# Patient Record
Sex: FEMALE | Race: WHITE | Employment: FULL TIME | URBAN - METROPOLITAN AREA
[De-identification: names, ages, dates, MRNs, and addresses within clinical notes are randomized per-mention and may not be internally consistent; named-entity substitution may affect disease eponyms.]

---

## 2017-02-23 PROBLEM — Z98.890 H/O ASCENDING AORTA REPAIR: Status: ACTIVE | Noted: 2017-02-23

## 2017-06-23 ENCOUNTER — HOSPITAL ENCOUNTER (OUTPATIENT)
Dept: CT IMAGING | Age: 42
Discharge: HOME | End: 2017-06-23
Attending: INTERNAL MEDICINE
Payer: COMMERCIAL

## 2017-06-23 DIAGNOSIS — Z98.890 HX OF ASCENDING AORTA REPAIR: ICD-10-CM

## 2017-06-23 PROCEDURE — 71275 CT ANGIOGRAPHY CHEST: CPT

## 2017-06-23 PROCEDURE — 74011636320 HC RX REV CODE- 636/320: Performed by: INTERNAL MEDICINE

## 2017-06-23 PROCEDURE — 74011000258 HC RX REV CODE- 258: Performed by: INTERNAL MEDICINE

## 2017-06-23 RX ORDER — SODIUM CHLORIDE 0.9 % (FLUSH) 0.9 %
10 SYRINGE (ML) INJECTION
Status: COMPLETED | OUTPATIENT
Start: 2017-06-23 | End: 2017-06-23

## 2017-06-23 RX ADMIN — Medication 10 ML: at 10:18

## 2017-06-23 RX ADMIN — SODIUM CHLORIDE 100 ML: 900 INJECTION, SOLUTION INTRAVENOUS at 10:18

## 2017-06-23 RX ADMIN — IOPAMIDOL 119 ML: 755 INJECTION, SOLUTION INTRAVENOUS at 10:18

## 2017-06-27 NOTE — PROGRESS NOTES
Called and informed pt per Dr. Page Acevedo. Albesa Lesch CT scan showed stable aorta with no change.   Pt voiced understanding./wc

## 2018-06-15 ENCOUNTER — HOSPITAL ENCOUNTER (OUTPATIENT)
Dept: CT IMAGING | Age: 43
Discharge: HOME | End: 2018-06-15
Attending: INTERNAL MEDICINE
Payer: COMMERCIAL

## 2018-06-15 DIAGNOSIS — Q87.40 MARFAN'S SYNDROME: Chronic | ICD-10-CM

## 2018-06-15 DIAGNOSIS — I77.810 ASCENDING AORTA DILATION (HCC): Chronic | ICD-10-CM

## 2018-06-15 DIAGNOSIS — Z98.890 H/O ASCENDING AORTA REPAIR: ICD-10-CM

## 2018-06-15 LAB — CREAT BLD-MCNC: 0.7 MG/DL (ref 0.8–1.5)

## 2018-06-15 PROCEDURE — 71275 CT ANGIOGRAPHY CHEST: CPT

## 2018-06-15 PROCEDURE — 74011636320 HC RX REV CODE- 636/320: Performed by: INTERNAL MEDICINE

## 2018-06-15 PROCEDURE — 82565 ASSAY OF CREATININE: CPT

## 2018-06-15 PROCEDURE — 74011000258 HC RX REV CODE- 258: Performed by: INTERNAL MEDICINE

## 2018-06-15 RX ORDER — SODIUM CHLORIDE 0.9 % (FLUSH) 0.9 %
10 SYRINGE (ML) INJECTION
Status: COMPLETED | OUTPATIENT
Start: 2018-06-15 | End: 2018-06-15

## 2018-06-15 RX ADMIN — IOPAMIDOL 117 ML: 755 INJECTION, SOLUTION INTRAVENOUS at 10:37

## 2018-06-15 RX ADMIN — SODIUM CHLORIDE 100 ML: 900 INJECTION, SOLUTION INTRAVENOUS at 10:37

## 2018-06-15 RX ADMIN — Medication 10 ML: at 10:37

## 2019-03-08 PROBLEM — R00.2 PALPITATIONS: Status: ACTIVE | Noted: 2019-03-08

## 2019-03-15 ENCOUNTER — HOSPITAL ENCOUNTER (OUTPATIENT)
Dept: CT IMAGING | Age: 44
Discharge: HOME | End: 2019-03-15
Attending: INTERNAL MEDICINE
Payer: COMMERCIAL

## 2019-03-15 DIAGNOSIS — I77.810 ASCENDING AORTA DILATION (HCC): Chronic | ICD-10-CM

## 2019-03-15 LAB — CREAT BLD-MCNC: 0.7 MG/DL (ref 0.8–1.5)

## 2019-03-15 PROCEDURE — 71275 CT ANGIOGRAPHY CHEST: CPT

## 2019-03-15 PROCEDURE — 74011636320 HC RX REV CODE- 636/320: Performed by: INTERNAL MEDICINE

## 2019-03-15 PROCEDURE — 82565 ASSAY OF CREATININE: CPT

## 2019-03-15 PROCEDURE — 74011000258 HC RX REV CODE- 258: Performed by: INTERNAL MEDICINE

## 2019-03-15 RX ORDER — SODIUM CHLORIDE 0.9 % (FLUSH) 0.9 %
10 SYRINGE (ML) INJECTION
Status: COMPLETED | OUTPATIENT
Start: 2019-03-15 | End: 2019-03-15

## 2019-03-15 RX ADMIN — IOPAMIDOL 119 ML: 755 INJECTION, SOLUTION INTRAVENOUS at 09:55

## 2019-03-15 RX ADMIN — Medication 10 ML: at 09:55

## 2019-03-15 RX ADMIN — SODIUM CHLORIDE 100 ML: 900 INJECTION, SOLUTION INTRAVENOUS at 09:55

## 2021-03-17 ENCOUNTER — HOSPITAL ENCOUNTER (OUTPATIENT)
Dept: CT IMAGING | Age: 46
Discharge: HOME | End: 2021-03-17
Attending: INTERNAL MEDICINE
Payer: COMMERCIAL

## 2021-03-17 DIAGNOSIS — Q87.40 MARFAN'S DISEASE: Chronic | ICD-10-CM

## 2021-03-17 DIAGNOSIS — Z98.890 H/O ASCENDING AORTA REPAIR: ICD-10-CM

## 2021-03-17 DIAGNOSIS — I77.810 ASCENDING AORTA DILATION (HCC): Chronic | ICD-10-CM

## 2021-03-17 PROCEDURE — 74011000636 HC RX REV CODE- 636: Performed by: INTERNAL MEDICINE

## 2021-03-17 PROCEDURE — 74011000258 HC RX REV CODE- 258: Performed by: INTERNAL MEDICINE

## 2021-03-17 PROCEDURE — 71275 CT ANGIOGRAPHY CHEST: CPT

## 2021-03-17 RX ORDER — SODIUM CHLORIDE 0.9 % (FLUSH) 0.9 %
10 SYRINGE (ML) INJECTION
Status: COMPLETED | OUTPATIENT
Start: 2021-03-17 | End: 2021-03-17

## 2021-03-17 RX ADMIN — Medication 10 ML: at 14:29

## 2021-03-17 RX ADMIN — SODIUM CHLORIDE 100 ML: 900 INJECTION, SOLUTION INTRAVENOUS at 14:29

## 2021-03-17 RX ADMIN — IOPAMIDOL 100 ML: 755 INJECTION, SOLUTION INTRAVENOUS at 14:29

## 2021-03-31 ENCOUNTER — TRANSCRIBE ORDER (OUTPATIENT)
Dept: SCHEDULING | Age: 46
End: 2021-03-31

## 2021-03-31 DIAGNOSIS — K76.89 LIVER CYST: ICD-10-CM

## 2021-03-31 DIAGNOSIS — R17 ELEVATED BILIRUBIN: Primary | ICD-10-CM

## 2021-04-09 ENCOUNTER — HOSPITAL ENCOUNTER (OUTPATIENT)
Dept: CT IMAGING | Age: 46
Discharge: HOME | End: 2021-04-09
Attending: FAMILY MEDICINE
Payer: COMMERCIAL

## 2021-04-09 DIAGNOSIS — R17 ELEVATED BILIRUBIN: ICD-10-CM

## 2021-04-09 DIAGNOSIS — K76.89 LIVER CYST: ICD-10-CM

## 2021-04-09 PROCEDURE — 74011000636 HC RX REV CODE- 636

## 2021-04-09 PROCEDURE — 74011000258 HC RX REV CODE- 258

## 2021-04-09 PROCEDURE — 74177 CT ABD & PELVIS W/CONTRAST: CPT

## 2021-04-09 RX ORDER — SODIUM CHLORIDE 0.9 % (FLUSH) 0.9 %
10 SYRINGE (ML) INJECTION
Status: COMPLETED | OUTPATIENT
Start: 2021-04-09 | End: 2021-04-09

## 2021-04-09 RX ADMIN — DIATRIZOATE MEGLUMINE AND DIATRIZOATE SODIUM 15 ML: 660; 100 LIQUID ORAL; RECTAL at 14:08

## 2021-04-09 RX ADMIN — Medication 10 ML: at 14:08

## 2021-04-09 RX ADMIN — SODIUM CHLORIDE 100 ML: 900 INJECTION, SOLUTION INTRAVENOUS at 14:08

## 2021-04-09 RX ADMIN — IOPAMIDOL 100 ML: 755 INJECTION, SOLUTION INTRAVENOUS at 14:08

## 2022-03-18 PROBLEM — Z98.890 H/O ASCENDING AORTA REPAIR: Status: ACTIVE | Noted: 2017-02-23

## 2022-03-19 PROBLEM — R00.2 PALPITATIONS: Status: ACTIVE | Noted: 2019-03-08

## 2022-05-30 DIAGNOSIS — E03.9 PRIMARY HYPOTHYROIDISM: Primary | ICD-10-CM

## 2022-06-08 ENCOUNTER — TELEPHONE (OUTPATIENT)
Dept: CARDIOLOGY CLINIC | Age: 47
End: 2022-06-08

## 2022-06-08 NOTE — TELEPHONE ENCOUNTER
Called s/w pt/  Pt states counselor (for anxiety) wants her to wear a heart rate variability monitor that fits around her  wrist. States they are being extra cautious and want to make sure cardiologist approves.

## 2022-06-08 NOTE — TELEPHONE ENCOUNTER
Patient called asking if it is ok for her to use a  heart rate variability monitor? Please call patient and advise.

## 2022-10-31 ENCOUNTER — OFFICE VISIT (OUTPATIENT)
Dept: CARDIOLOGY CLINIC | Age: 47
End: 2022-10-31
Payer: COMMERCIAL

## 2022-10-31 ENCOUNTER — TELEPHONE (OUTPATIENT)
Dept: CARDIOLOGY CLINIC | Age: 47
End: 2022-10-31

## 2022-10-31 VITALS
HEIGHT: 72 IN | WEIGHT: 143 LBS | SYSTOLIC BLOOD PRESSURE: 100 MMHG | DIASTOLIC BLOOD PRESSURE: 70 MMHG | HEART RATE: 60 BPM | BODY MASS INDEX: 19.37 KG/M2

## 2022-10-31 DIAGNOSIS — Z98.890 H/O ASCENDING AORTA REPAIR: Primary | ICD-10-CM

## 2022-10-31 DIAGNOSIS — I77.810 ASCENDING AORTA DILATION (HCC): ICD-10-CM

## 2022-10-31 DIAGNOSIS — Q87.40 MARFAN'S DISEASE: ICD-10-CM

## 2022-10-31 PROCEDURE — 99214 OFFICE O/P EST MOD 30 MIN: CPT | Performed by: INTERNAL MEDICINE

## 2022-10-31 ASSESSMENT — ENCOUNTER SYMPTOMS: SHORTNESS OF BREATH: 0

## 2022-10-31 NOTE — TELEPHONE ENCOUNTER
Pt wanted to wait on scheduling echo until she hears from the hospital regarding scheduling cta. Please call pt with any questions. Thank you.

## 2022-10-31 NOTE — PROGRESS NOTES
[address], [address] [facility], [address] [place] [address]  PHONE: [phone]    Berneice Aase  1975      SUBJECTIVE:   Berneice Aase is a 49 y.o. female seen for a follow up visit regarding the following:     Chief Complaint   Patient presents with    Palpitations       HPI:    54-year-old female comes back for follow-up she has had Marfan's history of aortic valve surgery Dr. Malou Herrera done at [facility] originally. She is done very well constant follow-up CT scan showing stable anatomy. She has been doing well recently with no chest pain or shortness of breath. She has not had any significant swelling. Past Medical History, Past Surgical History, Family history, Social History, and Medications were all reviewed with the patient today and updated as necessary.        No Known Allergies  Past Medical History:   Diagnosis Date    Allergic rhinitis     Anterior chest wall pain     Anxiety     Aortic aneurysm (HCC)     dilation of the aorta/stable post repair-- Taty Barks     Chronic pain     back pain/ connective tissue / Marfan syndrome    Depression     Depression     Ectopia lentis     Hashimoto's thyroiditis     Insomnia     Marfan's syndrome     Primary hypothyroidism     Raynaud's disease     Rheumatic tricuspid regurgitation     insuff    Tarlov cysts     Dural ectasia    Thyroid nodule      Past Surgical History:   Procedure Laterality Date     SECTION   and     GYN      ovarian cyst removed    GYN      endometrial ablation    HEENT Bilateral      myringotomy    OTHER SURGICAL HISTORY  2016    Left eye surgery/Lensectomy    OTHER SURGICAL HISTORY      Aortic root repair    TONSILLECTOMY AND ADENOIDECTOMY  As a child    TUBAL LIGATION       Family History   Problem Relation Age of Onset    Heart Disease Father     Diabetes Father     No Known Problems Mother     Hypertension Father     No Known Problems Sister     No Known Problems Brother     Other Son marfans    No Known Problems Son       Social History     Tobacco Use    Smoking status: Never    Smokeless tobacco: Never   Substance Use Topics    Alcohol use: Not Currently       ROS:    Review of Systems   Constitutional: Negative for decreased appetite and weight loss. Cardiovascular:  Negative for chest pain, dyspnea on exertion, irregular heartbeat and palpitations. Respiratory:  Negative for shortness of breath. Hematologic/Lymphatic: Negative for bleeding problem. PHYSICAL EXAM:    /70   Pulse 60   Ht 6' 2\" (1.88 m)   Wt 143 lb (64.9 kg)   BMI 18.36 kg/m²        Wt Readings from Last 3 Encounters:   10/31/22 143 lb (64.9 kg)   04/21/22 140 lb (63.5 kg)   11/03/21 144 lb 3.2 oz (65.4 kg)     BP Readings from Last 3 Encounters:   10/31/22 100/70   04/21/22 100/62   11/15/21 100/60         Physical Exam  Constitutional:       General: She is not in acute distress. Cardiovascular:      Rate and Rhythm: Normal rate and regular rhythm. Heart sounds: No murmur heard. No gallop. Pulmonary:      Effort: Pulmonary effort is normal.      Breath sounds: No rales. Abdominal:      Tenderness: There is no abdominal tenderness. Musculoskeletal:         General: No swelling. Neurological:      Mental Status: She is alert. Medical problems and test results were reviewed with the patient today. ASSESSMENT and PLAN    Frank Castañeda was seen today for palpitations. Diagnoses and all orders for this visit:    H/O ascending aorta repair she had this done by Dr. Izabella Selby at [facility] she is done well last CTA was 2021 will have a repeat that this March will be 3 2 years. Also inform her echo was reviewed with  -     CTA CHEST W CONTRAST; Future  -     Transthoracic echocardiogram (TTE) complete with contrast, bubble, strain, and 3D PRN; Future    Marfan's disease has been very stable lately. -     CTA CHEST W CONTRAST;  Future  -     Transthoracic echocardiogram (TTE) complete with contrast, bubble, strain, and 3D PRN; Future    Ascending aorta dilation (HCC) status postrepair and doing well. -     CTA CHEST W CONTRAST; Future      [unfilled]      No follow-up provider specified.     Caleb Mcdonald MD  10/31/2022  1:42 PM

## 2022-11-01 ENCOUNTER — TELEPHONE (OUTPATIENT)
Dept: CARDIOLOGY CLINIC | Age: 47
End: 2022-11-01

## 2022-11-01 NOTE — TELEPHONE ENCOUNTER
I scheduled her echo but she has a question about the type of echo. Did Dr Neri Vaughn mean to order OLEGARIO echo?  Please call

## 2022-11-02 ENCOUNTER — OFFICE VISIT (OUTPATIENT)
Dept: ENDOCRINOLOGY | Age: 47
End: 2022-11-02
Payer: COMMERCIAL

## 2022-11-02 ENCOUNTER — OFFICE VISIT (OUTPATIENT)
Dept: NEUROSURGERY | Age: 47
End: 2022-11-02
Payer: COMMERCIAL

## 2022-11-02 VITALS
OXYGEN SATURATION: 97 % | DIASTOLIC BLOOD PRESSURE: 68 MMHG | HEART RATE: 53 BPM | BODY MASS INDEX: 18.87 KG/M2 | WEIGHT: 147 LBS | SYSTOLIC BLOOD PRESSURE: 114 MMHG

## 2022-11-02 VITALS
TEMPERATURE: 97.9 F | WEIGHT: 143 LBS | BODY MASS INDEX: 19.37 KG/M2 | OXYGEN SATURATION: 99 % | HEART RATE: 53 BPM | HEIGHT: 72 IN | SYSTOLIC BLOOD PRESSURE: 109 MMHG | DIASTOLIC BLOOD PRESSURE: 60 MMHG

## 2022-11-02 DIAGNOSIS — E06.3 HASHIMOTO'S THYROIDITIS: ICD-10-CM

## 2022-11-02 DIAGNOSIS — E03.9 PRIMARY HYPOTHYROIDISM: Primary | ICD-10-CM

## 2022-11-02 DIAGNOSIS — M54.16 LUMBAR RADICULOPATHY: Primary | ICD-10-CM

## 2022-11-02 DIAGNOSIS — G89.29 CHRONIC LEFT-SIDED LOW BACK PAIN WITHOUT SCIATICA: ICD-10-CM

## 2022-11-02 DIAGNOSIS — M54.50 CHRONIC LEFT-SIDED LOW BACK PAIN WITHOUT SCIATICA: ICD-10-CM

## 2022-11-02 DIAGNOSIS — E04.1 THYROID NODULE: ICD-10-CM

## 2022-11-02 PROCEDURE — 99214 OFFICE O/P EST MOD 30 MIN: CPT | Performed by: INTERNAL MEDICINE

## 2022-11-02 PROCEDURE — 99203 OFFICE O/P NEW LOW 30 MIN: CPT | Performed by: NURSE PRACTITIONER

## 2022-11-02 RX ORDER — LEVOTHYROXINE SODIUM 50 UG/1
TABLET ORAL
Qty: 125 TABLET | Refills: 3 | Status: SHIPPED | OUTPATIENT
Start: 2022-11-02

## 2022-11-02 ASSESSMENT — PATIENT HEALTH QUESTIONNAIRE - PHQ9
SUM OF ALL RESPONSES TO PHQ9 QUESTIONS 1 & 2: 0
1. LITTLE INTEREST OR PLEASURE IN DOING THINGS: 0
2. FEELING DOWN, DEPRESSED OR HOPELESS: 0
SUM OF ALL RESPONSES TO PHQ QUESTIONS 1-9: 0

## 2022-11-02 ASSESSMENT — ENCOUNTER SYMPTOMS
ROS SKIN COMMENTS: DENIES HAIR LOSS, DRY SKIN.
DIARRHEA: 0
CONSTIPATION: 0

## 2022-11-02 NOTE — PROGRESS NOTES
Duane Weber MD, [facility]  [address], [address] [facility], [address] [place] [address]  Phone [phone]  Facsimile [phone]          Michele Hernández is a 49 y.o. female seen 11/2/2022 for follow up of hypothyroidism        ASSESSMENT AND PLAN:    1. Primary hypothyroidism  She is clinically and biochemically euthyroid. Follow-up in 1 year. - LEVOXYL 50 MCG tablet; 1 tablet once a day with an extra tablet on Sundays, Wednesdays and Fridays  Dispense: 125 tablet; Refill: 3    2. Hashimoto's thyroiditis  Based on her thyroid ultrasound appearance and positive thyroid peroxidase antibodies, she has Hashimoto's thyroiditis. 3. Thyroid nodule  Thyroid ultrasound performed 11/2021 revealed that the hypoechoic nodule versus pseudo-nodule in the mid right lobe was stable in size compared to the prior ultrasound. I will not pursue further imaging unless there is a change in her physical examination and/or symptoms. Follow-up and Dispositions    Return in about 1 year (around 11/2/2023). HISTORY OF PRESENT ILLNESS:    THYROID DISEASE    Presentation: Hypothyroidism secondary to Hashimoto's thyroiditis. Symptoms: See review of systems. Treatment: Takes name brand in AM correctly. Labs:   6/1/2014: TSH 4.470.  8/5/2014: TSH 3.226, TPO Ab >900.   1/2/2015: TSH 3.670, TPO Ab >900.   2/26/2016: TSH 5.610, T4 7.0, T3 uptake 27, free thyroxine index 1.9.   3/9/2016: TSH 2.710, TPO Ab 1173.  6/8/2016: TSH 1.510.  12/20/2016: TSH 2.542.    3/17/2017: TSH 0.966.    10/31/2017: TSH 2.010.  10/1/2018: TSH 1.350.  10/11/2019: TSH 1.310.  3/10/2020: TSH 2.890, total T4 7.4, T3 of 3.5, free thyroxine index 1.9.  10/16/2020: TSH 2.310.  10/27/2021: TSH 2.760.  10/31/2022: TSH 1.260.       THYROID NODULAR DISEASE    Presentation: Goiter palpated on examination by primary care physician.      Associated findings/symptoms: No history of head/neck radiation or family history of thyroid cancer. Denies neck enlargement/pain/pressure. Denies difficulty swallowing, shortness of breath, hoarseness. Imaging:   Thyroid ultrasound 6/26/2014: Right lobe 5.7 x 1.8 x 1.7 cm, left lobe 6.1 x 1.7 x 1.3 cm. There is modest heterogeneous echogenicity of the thyroid overall. There is mild hyperemia bilaterally. In the mid right lobe there is a hypoechoic area measuring 0.7 cm. No dominant nodule is identified. Limited thyroid ultrasound 8/5/2014: The thyroid gland is heterogeneous in echotexture with mildly increased vascularity. In the mid right lobe there is a hypoechoic area measuring approximately 0.6 cm. There are no obvious nodules. Thyroid ultrasound 12/21/2016: Right lobe 1.87 x 1.47 x 4.85 cm, mildly heterogeneous echotexture. In the mid right lobe there is a hypoechoic area measuring 0.25 x 0.46 cm. Isthmus measures 0.41 cm. Left lobe 1.38 x 1.87 x 5.06 cm, heterogeneous echotexture, no obvious nodules. Thyroid ultrasound 11/1/2021: Right lobe 1.60 x 1.50 x 5.32 cm, mildly heterogeneous echotexture. There are several tiny hypoechoic areas likely representing pseudonodules. In the mid right lobe there is a more focal hypoechoic area measuring 0.31 x 0.37 x 0.49 cm. Isthmus measures 0.32 cm. Left lobe 1.10 x 1.67 x 5.57 cm, mildly heterogeneous echotexture. There are several tiny hypoechoic areas likely representing pseudonodules. Review of Systems   Constitutional:  Negative for diaphoresis and fatigue. Weight increased 3 pounds since last visit. Cardiovascular:  Positive for palpitations (occasionally). Gastrointestinal:  Negative for constipation and diarrhea. Endocrine: Positive for cold intolerance. Negative for heat intolerance. Skin:         Denies hair loss, dry skin. Neurological:  Positive for tremors (longstanding history of tremulousness, stable).        Vital Signs:  /68 (Site: Left Upper Arm, Position: Sitting) Pulse 53   Wt 147 lb (66.7 kg)   SpO2 97%   BMI 18.87 kg/m²     Wt Readings from Last 3 Encounters:   11/02/22 147 lb (66.7 kg)   11/02/22 143 lb (64.9 kg)   10/31/22 143 lb (64.9 kg)       Physical Exam  Constitutional:       General: She is not in acute distress. Neck:      Thyroid: No thyroid mass or thyromegaly. Cardiovascular:      Rate and Rhythm: Normal rate and regular rhythm. Lymphadenopathy:      Cervical: No cervical adenopathy. Neurological:      Motor: No tremor. Orders Placed This Encounter   Procedures    TSH with Reflex     Standing Status:   Future     Standing Expiration Date:   5/2/2024         Current Outpatient Medications   Medication Sig Dispense Refill    LEVOXYL 50 MCG tablet 1 tablet once a day with an extra tablet on Sundays, Wednesdays and Fridays 125 tablet 3    Multiple Vitamin (MULTIVITAMIN PO) Take by mouth      ZINC PO Take by mouth      ALPRAZolam (XANAX) 0.25 MG tablet Take 0.25 mg by mouth. atenolol (TENORMIN) 25 MG tablet Take 25 mg by mouth daily      Cetirizine HCl 10 MG CAPS Take 1 tablet by mouth daily      meclizine (ANTIVERT) 25 MG tablet Take 25 mg by mouth as needed      metaxalone (SKELAXIN) 800 MG tablet Take 400 mg by mouth as needed      QUEtiapine (SEROQUEL) 50 MG tablet Take 25-50 mg by mouth       No current facility-administered medications for this visit.

## 2022-11-02 NOTE — PROGRESS NOTES
[facility] NOTE:   History of Present Illness:    CC: Left-sided low back pain    Cody Allred is a 49 y.o. female here to be evaluated for her left-sided low back pain. Patient states she has been experiencing this pain for a long time but within the past year her pain does seem to have intensified. Patient states she gets a lot of pain off the left side of her back down into the left buttock and some instances down into the left leg. Patient notes the pain is made worse with either standing or sitting for prolonged periods. Patient states she is aware that she has Marfan syndrome and is concerned that her connective tissue disorder may be a factor.     Past Medical History:   Diagnosis Date    Allergic rhinitis     Anterior chest wall pain     Anxiety     Aortic aneurysm (HCC)     dilation of the aorta/stable post repair-- Lilian Martin     Chronic pain     back pain/ connective tissue / Marfan syndrome    Depression     Ectopia lentis     Hashimoto's thyroiditis     Insomnia     Marfan's syndrome     Primary hypothyroidism     Raynaud's disease     Rheumatic tricuspid regurgitation     insuff    Tarlov cysts     Dural ectasia    Thyroid nodule       Past Surgical History:   Procedure Laterality Date     SECTION   and     ENDOMETRIAL ABLATION      MYRINGOTOMY Bilateral      myringotomy    OTHER SURGICAL HISTORY  2016    Left eye surgery/Lensectomy    OTHER SURGICAL HISTORY      Aortic root repair    OVARIAN CYST SURGERY      TONSILLECTOMY AND ADENOIDECTOMY  As a child    TUBAL LIGATION       No Known Allergies   Family History   Problem Relation Age of Onset    Heart Disease Father     Diabetes Father     No Known Problems Mother     Hypertension Father     No Known Problems Sister     No Known Problems Brother     Other Son         marfans    No Known Problems Son       Social History     Socioeconomic History    Marital status:  Spouse name: Not on file    Number of children: Not on file    Years of education: Not on file    Highest education level: Not on file   Occupational History    Not on file   Tobacco Use    Smoking status: Never    Smokeless tobacco: Never   Vaping Use    Vaping Use: Never used   Substance and Sexual Activity    Alcohol use: Not Currently    Drug use: No    Sexual activity: Not on file     Comment: BTL/Ablation   Other Topics Concern    Not on file   Social History Narrative    Not on file     Social Determinants of Health     Financial Resource Strain: Not on file   Food Insecurity: Not on file   Transportation Needs: Not on file   Physical Activity: Not on file   Stress: Not on file   Social Connections: Not on file   Intimate Partner Violence: Not on file   Housing Stability: Not on file     Current Outpatient Medications   Medication Sig Dispense Refill    Multiple Vitamin (MULTIVITAMIN PO) Take by mouth      ZINC PO Take by mouth      ALPRAZolam (XANAX) 0.25 MG tablet Take 0.25 mg by mouth. atenolol (TENORMIN) 25 MG tablet Take 25 mg by mouth daily      Cetirizine HCl 10 MG CAPS Take 1 tablet by mouth daily      levothyroxine (SYNTHROID) 50 MCG tablet 1 tablet once a day with an extra tablet on Sundays, Wednesdays and Fridays      meclizine (ANTIVERT) 25 MG tablet Take 25 mg by mouth as needed      metaxalone (SKELAXIN) 800 MG tablet Take 400 mg by mouth as needed      QUEtiapine (SEROQUEL) 50 MG tablet Take 25-50 mg by mouth       No current facility-administered medications for this visit.      Patient Active Problem List   Diagnosis    Ascending aorta dilation (HCC)    Mediastinal adenopathy    Marfan's disease    H/O ascending aorta repair    Hashimoto's thyroiditis    Post traumatic stress disorder (PTSD)    Tricuspid regurgitation    Primary hypothyroidism    Palpitations    Depression    Thyroid nodule          ROS Review of Systems    Constitutional:                    No recent weight changes, fever, fatigue, sleep difficulties, loss of appetite   ENT/Mouth:  No hearing loss, No ringing in the ears, chronic sinus problem, nose bleeds,sore throat, voice change, hoarseness, swollen glands in neck, or difficulties with chewing and swallowing. Cardiovascular:  No chest pain/angina pectoris, palpitations, swelling of feet/ankles/hands, or calf pain while walking. Respiratory: No chronic or frequent coughs, spitting up blood, shortness of breath, No asthma, or wheezing. Gastrointestinal: No a bdominal pain, heartburn, nausea, vomiting, constipation, or frequent diarrhea     Genitourinary: No frequent urination, burning or painful urination, or blood in urine     Musculoskeletal:   POS left sided low back pain      Integument:   No rash/itching     Neurological:   Dizziness/vertigo, No numbness/tingling sensation, tremors, No weakness in limbs, frequent or recurring headaches, memory loss or confusion. Physical Exam:    General: No acute distress  Head normocephalic and atraumatic  Mood and affect appropriate  CV: Regular rate   Resp: No increased work of breathing  Skin: warm and dry   Awake, alert, and oriented   Speech fluent  Eyes open spontaneously   Face symmetric and tongue midline on protrusion  Sternocleidomastoid and trapezius 5/5  No mid-line cervical, thoracic, or lumbar tenderness to palpation   Patient with strength exam as follows:   Upper Extremities: Right Left      Deltoid  5 5    Biceps  5 5    Triceps  5 5      5 5   Hand Intrinsics  5 5  Wrist flexors/extensors  5 5     Lower Extremities:      Hip Flex 5 +4    Quads  5 +4    Hamstrings 5 5    Dorsiflex 5 5    Plantarflex 5 5    EHL  5 5  Sensation intact to light touch and pin-prick   DTR 2+  No clonus or babinski present   No Parham's sign present bilaterally   Gait mildly antalgic    Assessment & Plan:  Refugio Kerr is a 49 y.o. female who presents to be evaluated for her left-sided low back pain.   I am sending the patient for 6-week course of lumbar physical therapy. The patient will follow up with me in 6 weeks to discuss possible efficacy. Patient may ultimately require an MRI as well as lumbar x-rays to check for any structural malalignments within the spine. No diagnosis found. Notes are transcribed with [employer], a medical voice recording dictation service, and may contain minor errors.     Nohemy Liang, NP  [id number]

## 2022-12-07 ENCOUNTER — TELEPHONE (OUTPATIENT)
Dept: CARDIOLOGY CLINIC | Age: 47
End: 2022-12-07

## 2022-12-07 NOTE — TELEPHONE ENCOUNTER
Very sick on 12/4/22. Diagnosed with flu by NP on 12/5/22. Felt much better on 12/6/22. Last night, slept 1 hour, then woke up, and was wide awake for the rests of the night and today. Felt \"weirdly hot\", could not \"catch\" her breath, and was very anxious, this morning. Afebrile. Very concerned that fitness watch shows HR jumping from 55 to 97, when usual HR is <55. Occasional palpitations, but no more than usual.   No chest pain. Xanax 0.25 mg and Seroquel 50 mg have not helped. Went to work, today, but felt so bad that she came home after 2 hours. Took one dose of Mucinex D, yesterday. Very concerned about her heart. Has also called PCP NP for recommendations. Patient asks why her HR is so erratic and why she cannot relax. She asks for Dr. Edilson Davidson recommendations. Advised patient that I will notify Dr. Jhonatan Meza of above and call with his recommendations. Advised patient that HR may be higher and more erratic with flu. Advised patient to avoid any med with decongestant, which can increase BP and HR. Patient verbalized understanding. Advised patient to ask PCP for recommendations.

## 2022-12-07 NOTE — TELEPHONE ENCOUNTER
MD Radha Hearn, GEOVANNA  Caller: Unspecified (Today,  3:39 PM)  Also she does have some slight acute viral illness and the flu which is making her do this and make her heart rate erratic I do not think we need to do anything different right now from a heart standpoint I think she will be okay from that

## 2022-12-07 NOTE — TELEPHONE ENCOUNTER
Advised patient of Dr. Gamboa Sale response. Advised patient to rest, increase fluids, and call with any further questions or concerns. Patient verbalized understanding.

## 2022-12-07 NOTE — TELEPHONE ENCOUNTER
Patient called stating she tested positive for the flu on Monday. Tuesday patient states she felt fine. Patient states she slept 1 hour last night. Patients fitness watch has her HR=55-90, liteheaded, hot and cold spells. Patient states she took one Xanax for anxiety but it hasn't helped.

## 2022-12-14 ENCOUNTER — PATIENT MESSAGE (OUTPATIENT)
Dept: NEUROSURGERY | Age: 47
End: 2022-12-14

## 2022-12-14 ENCOUNTER — OFFICE VISIT (OUTPATIENT)
Dept: NEUROSURGERY | Age: 47
End: 2022-12-14
Payer: COMMERCIAL

## 2022-12-14 VITALS
HEART RATE: 56 BPM | TEMPERATURE: 97.4 F | HEIGHT: 72 IN | OXYGEN SATURATION: 100 % | BODY MASS INDEX: 20.05 KG/M2 | WEIGHT: 148 LBS | SYSTOLIC BLOOD PRESSURE: 112 MMHG | DIASTOLIC BLOOD PRESSURE: 68 MMHG

## 2022-12-14 DIAGNOSIS — M54.50 LOW BACK PAIN, UNSPECIFIED BACK PAIN LATERALITY, UNSPECIFIED CHRONICITY, UNSPECIFIED WHETHER SCIATICA PRESENT: Primary | ICD-10-CM

## 2022-12-14 DIAGNOSIS — M54.16 LUMBAR RADICULOPATHY: ICD-10-CM

## 2022-12-14 PROCEDURE — 99213 OFFICE O/P EST LOW 20 MIN: CPT | Performed by: NURSE PRACTITIONER

## 2022-12-14 ASSESSMENT — PATIENT HEALTH QUESTIONNAIRE - PHQ9
SUM OF ALL RESPONSES TO PHQ QUESTIONS 1-9: 0
1. LITTLE INTEREST OR PLEASURE IN DOING THINGS: 0
2. FEELING DOWN, DEPRESSED OR HOPELESS: 0
SUM OF ALL RESPONSES TO PHQ QUESTIONS 1-9: 0
SUM OF ALL RESPONSES TO PHQ QUESTIONS 1-9: 0
SUM OF ALL RESPONSES TO PHQ9 QUESTIONS 1 & 2: 0
SUM OF ALL RESPONSES TO PHQ QUESTIONS 1-9: 0

## 2022-12-14 NOTE — PROGRESS NOTES
[facility] NOTE:   History of Present Illness:    CC: Physical therapy follow-up    Betty Jarrell is a 49 y.o. female here to follow-up after completing 6 weeks of lumbar physical therapy. Patient states that the physical therapy did not seem to make any of her symptoms worse but she did not experience any real improvement. Patient states she did get a very brief relief from heat and a TENS unit when applied but nothing gave any lasting comfort.     Past Medical History:   Diagnosis Date    Allergic rhinitis     Anterior chest wall pain     Anxiety     Aortic aneurysm (HCC)     dilation of the aorta/stable post repair-- Madeline Love     Chronic pain     back pain/ connective tissue / Marfan syndrome    Depression     Ectopia lentis     Hashimoto's thyroiditis     Insomnia     Marfan's syndrome     Primary hypothyroidism     Raynaud's disease     Rheumatic tricuspid regurgitation     insuff    Tarlov cysts     Dural ectasia    Thyroid nodule       Past Surgical History:   Procedure Laterality Date     SECTION   and     ENDOMETRIAL ABLATION      MYRINGOTOMY Bilateral      myringotomy    OTHER SURGICAL HISTORY  2016    Left eye surgery/Lensectomy    OTHER SURGICAL HISTORY      Aortic root repair    OVARIAN CYST SURGERY      TONSILLECTOMY AND ADENOIDECTOMY  As a child    TUBAL LIGATION       No Known Allergies   Family History   Problem Relation Age of Onset    Heart Disease Father     Diabetes Father     No Known Problems Mother     Hypertension Father     No Known Problems Sister     No Known Problems Brother     Other Son         marfans    No Known Problems Son       Social History     Socioeconomic History    Marital status:      Spouse name: Not on file    Number of children: Not on file    Years of education: Not on file    Highest education level: Not on file   Occupational History    Not on file   Tobacco Use    Smoking status: Never Smokeless tobacco: Never   Vaping Use    Vaping Use: Never used   Substance and Sexual Activity    Alcohol use: Not Currently    Drug use: No    Sexual activity: Not on file     Comment: BTL/Ablation   Other Topics Concern    Not on file   Social History Narrative    Not on file     Social Determinants of Health     Financial Resource Strain: Not on file   Food Insecurity: Not on file   Transportation Needs: Not on file   Physical Activity: Not on file   Stress: Not on file   Social Connections: Not on file   Intimate Partner Violence: Not on file   Housing Stability: Not on file     Current Outpatient Medications   Medication Sig Dispense Refill    LEVOXYL 50 MCG tablet 1 tablet once a day with an extra tablet on Sundays, Wednesdays and Fridays 125 tablet 3    Multiple Vitamin (MULTIVITAMIN PO) Take by mouth      ZINC PO Take by mouth      ALPRAZolam (XANAX) 0.25 MG tablet Take 0.25 mg by mouth. atenolol (TENORMIN) 25 MG tablet Take 25 mg by mouth daily      Cetirizine HCl 10 MG CAPS Take 1 tablet by mouth daily      meclizine (ANTIVERT) 25 MG tablet Take 25 mg by mouth as needed      metaxalone (SKELAXIN) 800 MG tablet Take 400 mg by mouth as needed      QUEtiapine (SEROQUEL) 50 MG tablet Take 25-50 mg by mouth       No current facility-administered medications for this visit.      Patient Active Problem List   Diagnosis    Ascending aorta dilation (HCC)    Mediastinal adenopathy    Marfan's disease    H/O ascending aorta repair    Hashimoto's thyroiditis    Post traumatic stress disorder (PTSD)    Tricuspid regurgitation    Primary hypothyroidism    Palpitations    Depression    Thyroid nodule          ROS Review of Systems    Constitutional:                    No recent weight changes, fever, fatigue, sleep difficulties, loss of appetite   ENT/Mouth:  No hearing loss, No ringing in the ears, chronic sinus problem, nose bleeds,sore throat, voice change, hoarseness, swollen glands in neck, or difficulties with chewing and swallowing. Cardiovascular:  No chest pain/angina pectoris, palpitations, swelling of feet/ankles/hands, or calf pain while walking. Respiratory: No chronic or frequent coughs, spitting up blood, shortness of breath, No asthma, or wheezing. Gastrointestinal: No a bdominal pain, heartburn, nausea, vomiting, constipation, or frequent diarrhea     Genitourinary: No frequent urination, burning or painful urination, or blood in urine     Musculoskeletal:   POS low back pain     Integument:   No rash/itching     Neurological:   Dizziness/vertigo, No numbness/tingling sensation, tremors, No weakness in limbs, frequent or recurring headaches, memory loss or confusion. Physical Exam:    General: No acute distress  Head normocephalic and atraumatic  Mood and affect appropriate  CV: Regular rate   Resp: No increased work of breathing  Skin: warm and dry   Awake, alert, and oriented   Speech fluent  Eyes open spontaneously   Face symmetric and tongue midline on protrusion  Sternocleidomastoid and trapezius 5/5  No mid-line cervical, thoracic, or lumbar tenderness to palpation   Patient with strength exam as follows:   Upper Extremities: Right Left      Deltoid  5 5    Biceps  5 5    Triceps  5 5      5 5   Hand Intrinsics  5 5  Wrist flexors/extensors  5 5     Lower Extremities:      Hip Flex 5 5    Quads  5 5    Hamstrings 5 5    Dorsiflex 5 5    Plantarflex 5 5    EHL  5 5  Sensation intact to light touch and pin-prick   DTR 2+  No clonus or babinski present   No Parham's sign present bilaterally   Gait normal    Assessment & Plan:  Guy Ramsey is a 49 y.o. female who presents to be evaluated after completing 6 weeks of physical therapy on her low back. Because the therapy failed to give the patient any relief of her current symptoms I am sending her for a lumbar MRI without contrast.  Patient will follow up with me after the MRI is complete to review the imaging.   No diagnosis

## 2022-12-15 NOTE — TELEPHONE ENCOUNTER
From: Juan C Wilkerson  To: Melissa No  Sent: 12/14/2022 9:18 PM EST  Subject: Physical therapy     I was in your office today and you ordered an MRI after my 6 weeks of PT. Do I discontinue PT at this time? Not sure what Im supposed to do about that now. Thank you!    Hilda Xavier

## 2022-12-28 DIAGNOSIS — M54.16 LUMBAR RADICULOPATHY: ICD-10-CM

## 2022-12-28 DIAGNOSIS — M54.50 LOW BACK PAIN, UNSPECIFIED BACK PAIN LATERALITY, UNSPECIFIED CHRONICITY, UNSPECIFIED WHETHER SCIATICA PRESENT: ICD-10-CM

## 2022-12-30 NOTE — TELEPHONE ENCOUNTER
Atenplol 25mg  #90  with refills   [facility] in [facility]

## 2023-01-03 NOTE — TELEPHONE ENCOUNTER
MEDICATION REFILL REQUEST      Name of Medication:  Atenolol  Dose:  25 mg   Frequency:  take 25 mg by mouth daily  Quantity:  ?  Days' supply:  ? Pharmacy [facility]/Location:  [facility]      Pt calling back about Atenolol 25 mg the pharmacy gave pt 3 pills to get her thru. Pt states she think she has 1 or 2 pills. Pt is former Dr. Henri Lee pt and is set to see Dr. Jacob Madera.

## 2023-01-04 ENCOUNTER — OFFICE VISIT (OUTPATIENT)
Dept: NEUROSURGERY | Age: 48
End: 2023-01-04
Payer: COMMERCIAL

## 2023-01-04 VITALS
OXYGEN SATURATION: 99 % | BODY MASS INDEX: 19.91 KG/M2 | WEIGHT: 147 LBS | DIASTOLIC BLOOD PRESSURE: 60 MMHG | HEART RATE: 50 BPM | SYSTOLIC BLOOD PRESSURE: 114 MMHG | HEIGHT: 72 IN | TEMPERATURE: 98.2 F

## 2023-01-04 DIAGNOSIS — M54.50 CHRONIC LEFT-SIDED LOW BACK PAIN WITHOUT SCIATICA: Primary | ICD-10-CM

## 2023-01-04 DIAGNOSIS — G96.191 TARLOV CYSTS: ICD-10-CM

## 2023-01-04 DIAGNOSIS — G89.29 CHRONIC LEFT-SIDED LOW BACK PAIN WITHOUT SCIATICA: Primary | ICD-10-CM

## 2023-01-04 PROCEDURE — 99213 OFFICE O/P EST LOW 20 MIN: CPT | Performed by: NURSE PRACTITIONER

## 2023-01-04 RX ORDER — ATENOLOL 25 MG/1
25 TABLET ORAL DAILY
Qty: 90 TABLET | Refills: 3 | Status: SHIPPED | OUTPATIENT
Start: 2023-01-04

## 2023-01-04 ASSESSMENT — PATIENT HEALTH QUESTIONNAIRE - PHQ9
1. LITTLE INTEREST OR PLEASURE IN DOING THINGS: 0
SUM OF ALL RESPONSES TO PHQ9 QUESTIONS 1 & 2: 0
SUM OF ALL RESPONSES TO PHQ QUESTIONS 1-9: 0
2. FEELING DOWN, DEPRESSED OR HOPELESS: 0
SUM OF ALL RESPONSES TO PHQ QUESTIONS 1-9: 0

## 2023-01-04 NOTE — TELEPHONE ENCOUNTER
Requested Prescriptions     Pending Prescriptions Disp Refills    atenolol (TENORMIN) 25 MG tablet 90 tablet 3     Sig: Take 1 tablet by mouth daily    Send to Mrs Grider Henry to sign.

## 2023-01-04 NOTE — PROGRESS NOTES
[facility] NOTE:   History of Present Illness:    CC: Lumbar MRI review    Rina Maher is a 49 y.o. female here to review her lumbar MRI. Patient states that she is still experiencing the left-sided low back pain that radiates down into her left buttock. Patient states that actually the drive into town to be seen aggravated her pain. The lumbar MRI reveals extensive posterior vertebral scalloping of the S1 and S2 vertebral bodies that is worse on the left. It is also noted multiple sacral Tarlov cysts. There is no evidence of nerve compromise on this study.     Past Medical History:   Diagnosis Date    Allergic rhinitis     Anterior chest wall pain     Anxiety     Aortic aneurysm (HCC)     dilation of the aorta/stable post repair-- Avanell Gal     Chronic pain     back pain/ connective tissue / Marfan syndrome    Depression     Ectopia lentis     Hashimoto's thyroiditis     Insomnia     Marfan's syndrome     Primary hypothyroidism     Raynaud's disease     Rheumatic tricuspid regurgitation     insuff    Tarlov cysts     Dural ectasia    Thyroid nodule       Past Surgical History:   Procedure Laterality Date     SECTION   and     ENDOMETRIAL ABLATION      MYRINGOTOMY Bilateral      myringotomy    OTHER SURGICAL HISTORY  2016    Left eye surgery/Lensectomy    OTHER SURGICAL HISTORY      Aortic root repair    OVARIAN CYST SURGERY      TONSILLECTOMY AND ADENOIDECTOMY  As a child    TUBAL LIGATION       No Known Allergies   Family History   Problem Relation Age of Onset    Heart Disease Father     Diabetes Father     No Known Problems Mother     Hypertension Father     No Known Problems Sister     No Known Problems Brother     Other Son         marfans    No Known Problems Son       Social History     Socioeconomic History    Marital status:      Spouse name: Not on file    Number of children: Not on file    Years of education: Not on file    Highest education level: Not on file   Occupational History    Not on file   Tobacco Use    Smoking status: Never    Smokeless tobacco: Never   Vaping Use    Vaping Use: Never used   Substance and Sexual Activity    Alcohol use: Not Currently    Drug use: No    Sexual activity: Not on file     Comment: BTL/Ablation   Other Topics Concern    Not on file   Social History Narrative    Not on file     Social Determinants of Health     Financial Resource Strain: Not on file   Food Insecurity: Not on file   Transportation Needs: Not on file   Physical Activity: Not on file   Stress: Not on file   Social Connections: Not on file   Intimate Partner Violence: Not on file   Housing Stability: Not on file     Current Outpatient Medications   Medication Sig Dispense Refill    atenolol (TENORMIN) 25 MG tablet Take 1 tablet by mouth daily 90 tablet 3    LEVOXYL 50 MCG tablet 1 tablet once a day with an extra tablet on Sundays, Wednesdays and Fridays 125 tablet 3    Multiple Vitamin (MULTIVITAMIN PO) Take by mouth      ZINC PO Take by mouth      ALPRAZolam (XANAX) 0.25 MG tablet Take 0.25 mg by mouth. Cetirizine HCl 10 MG CAPS Take 1 tablet by mouth daily      meclizine (ANTIVERT) 25 MG tablet Take 25 mg by mouth as needed      metaxalone (SKELAXIN) 800 MG tablet Take 400 mg by mouth as needed      QUEtiapine (SEROQUEL) 50 MG tablet Take 25-50 mg by mouth       No current facility-administered medications for this visit.      Patient Active Problem List   Diagnosis    Ascending aorta dilation (HCC)    Mediastinal adenopathy    Marfan's disease    H/O ascending aorta repair    Hashimoto's thyroiditis    Post traumatic stress disorder (PTSD)    Tricuspid regurgitation    Primary hypothyroidism    Palpitations    Depression    Thyroid nodule          ROS Review of Systems    Constitutional:                    No recent weight changes, fever, fatigue, sleep difficulties, loss of appetite   ENT/Mouth:  No hearing loss, No ringing in the ears, chronic sinus problem, nose bleeds,sore throat, voice change, hoarseness, swollen glands in neck, or difficulties with chewing and swallowing. Cardiovascular:  No chest pain/angina pectoris, palpitations, swelling of feet/ankles/hands, or calf pain while walking. Respiratory: No chronic or frequent coughs, spitting up blood, shortness of breath, No asthma, or wheezing. Gastrointestinal: No a bdominal pain, heartburn, nausea, vomiting, constipation, or frequent diarrhea     Genitourinary: No frequent urination, burning or painful urination, or blood in urine     Musculoskeletal:   Left sided low back and buttock     Integument:   No rash/itching     Neurological:   Dizziness/vertigo, No numbness/tingling sensation, tremors, No weakness in limbs, frequent or recurring headaches, memory loss or confusion. Physical Exam:    General: No acute distress  Head normocephalic and atraumatic  Mood and affect appropriate  CV: Regular rate   Resp: No increased work of breathing  Skin: warm and dry   Awake, alert, and oriented   Speech fluent  Eyes open spontaneously   Face symmetric and tongue midline on protrusion  Sternocleidomastoid and trapezius 5/5  No mid-line cervical, thoracic, or lumbar tenderness to palpation   Patient with strength exam as follows:     Sensation intact to light touch and pin-prick   DTR 2+  No clonus or babinski present   No Parham's sign present bilaterally   Gait normal    Assessment & Plan:  Zohra Sebastian is a 49 y.o. female who presents to review her lumbar MRI. I damply reviewed interpreted the imaging and do not feel that the patient would benefit from a neurosurgical intervention at this time. Patient is encouraged to consider possible SI joint injection and possible consultation with pain management. Patient can follow-up here as needed. 20 minutes was spent in chart review, patient consultation, and documentation.   No diagnosis found.    Notes are transcribed with [employer], a medical voice recording dictation service, and may contain minor errors.     Claudy Christianson, COLLEEN  [address]

## 2023-01-08 ENCOUNTER — PATIENT MESSAGE (OUTPATIENT)
Dept: NEUROSURGERY | Age: 48
End: 2023-01-08

## 2023-01-09 NOTE — TELEPHONE ENCOUNTER
From: Patria Montano  To: Jesusita Salazar  Sent: 1/8/2023 8:28 PM EST  Subject: Physical exam     I noticed on the notes from my last 2 visits that I have scores listed for a physical/strength exam. I was not physically examined either time. Are the numbers listed just default, fill in the blank type things that you all use? Same with the pain prick. I was never pricked with any device. Im just curious and want to understand the notes. The notes also do not include all of the details from my MRI. Is there a way to upload my MRI into "[employer] for other physicians can access it? Thank you.

## 2023-03-17 ENCOUNTER — HOSPITAL ENCOUNTER (OUTPATIENT)
Dept: CT IMAGING | Age: 48
Discharge: HOME | End: 2023-03-17
Payer: COMMERCIAL

## 2023-03-17 DIAGNOSIS — I77.810 ASCENDING AORTA DILATION (HCC): ICD-10-CM

## 2023-03-17 DIAGNOSIS — Z98.890 H/O ASCENDING AORTA REPAIR: ICD-10-CM

## 2023-03-17 DIAGNOSIS — Q87.40 MARFAN'S DISEASE: ICD-10-CM

## 2023-03-17 LAB — CREAT BLD-MCNC: 0.69 MG/DL (ref 0.8–1.5)

## 2023-03-17 PROCEDURE — 2580000003 HC RX 258: Performed by: INTERNAL MEDICINE

## 2023-03-17 PROCEDURE — 82565 ASSAY OF CREATININE: CPT

## 2023-03-17 PROCEDURE — 71275 CT ANGIOGRAPHY CHEST: CPT

## 2023-03-17 PROCEDURE — 6360000004 HC RX CONTRAST MEDICATION: Performed by: INTERNAL MEDICINE

## 2023-03-17 RX ORDER — SODIUM CHLORIDE 0.9 % (FLUSH) 0.9 %
10 SYRINGE (ML) INJECTION
Status: DISCONTINUED | OUTPATIENT
Start: 2023-03-17 | End: 2023-03-21 | Stop reason: HOSPADM

## 2023-03-17 RX ORDER — 0.9 % SODIUM CHLORIDE 0.9 %
100 INTRAVENOUS SOLUTION INTRAVENOUS
Status: COMPLETED | OUTPATIENT
Start: 2023-03-17 | End: 2023-03-17

## 2023-03-17 RX ADMIN — IOPAMIDOL 100 ML: 755 INJECTION, SOLUTION INTRAVENOUS at 08:42

## 2023-03-17 RX ADMIN — SODIUM CHLORIDE 100 ML: 9 INJECTION, SOLUTION INTRAVENOUS at 08:42

## 2023-03-24 ENCOUNTER — OFFICE VISIT (OUTPATIENT)
Dept: CARDIOLOGY CLINIC | Age: 48
End: 2023-03-24
Payer: COMMERCIAL

## 2023-03-24 VITALS
SYSTOLIC BLOOD PRESSURE: 110 MMHG | WEIGHT: 145 LBS | DIASTOLIC BLOOD PRESSURE: 66 MMHG | HEART RATE: 52 BPM | HEIGHT: 72 IN | BODY MASS INDEX: 19.64 KG/M2

## 2023-03-24 DIAGNOSIS — I77.810 ASCENDING AORTA DILATION (HCC): Primary | ICD-10-CM

## 2023-03-24 DIAGNOSIS — Z98.890 H/O ASCENDING AORTA REPAIR: ICD-10-CM

## 2023-03-24 DIAGNOSIS — Q87.40 MARFAN'S DISEASE: ICD-10-CM

## 2023-03-24 DIAGNOSIS — R00.2 PALPITATIONS: ICD-10-CM

## 2023-03-24 PROCEDURE — 93000 ELECTROCARDIOGRAM COMPLETE: CPT | Performed by: INTERNAL MEDICINE

## 2023-03-24 PROCEDURE — 99214 OFFICE O/P EST MOD 30 MIN: CPT | Performed by: INTERNAL MEDICINE

## 2023-03-24 NOTE — PROGRESS NOTES
[address], [address] [facility], [address] [place] [address]  PHONE: [phone]     23    NAME:  Perri Schwab  : 1975  MRN: [id number]       SUBJECTIVE:   Perri Schwab is a 51 y.o. female seen for a follow up visit regarding the following:     Chief Complaint   Patient presents with    Palpitations       HPI:   Prior Marfan's, surgery with Dr. Birgit Rowley in [address] [place] [address] in . Valve sparing root surgery in . CTA 3/2023: Stable appearance of ascending tubular thoracic aortic repair. Echo 3/2023: normal EF, no AI, no AS, mild MVP, mild MR    More family stress. This has been tremendous for her. Carries stress and anxiety in her chest, seeing counselor now. Walking some now. HR low, asx. NO CP. Patient denies recent history of orthopnea, PND, excessive dizziness and/or syncope. Past Medical History, Past Surgical History, Family history, Social History, and Medications were all reviewed with the patient today and updated as necessary. Current Outpatient Medications   Medication Sig Dispense Refill    atenolol (TENORMIN) 25 MG tablet Take 1 tablet by mouth daily 90 tablet 3    LEVOXYL 50 MCG tablet 1 tablet once a day with an extra tablet on Sundays,  and  125 tablet 3    Multiple Vitamin (MULTIVITAMIN PO) Take by mouth      ZINC PO Take by mouth as needed      ALPRAZolam (XANAX) 0.25 MG tablet Take 0.25 mg by mouth as needed. Cetirizine HCl 10 MG CAPS Take 1 tablet by mouth daily      meclizine (ANTIVERT) 25 MG tablet Take 25 mg by mouth as needed      metaxalone (SKELAXIN) 800 MG tablet Take 400 mg by mouth as needed      QUEtiapine (SEROQUEL) 50 MG tablet Take 25-50 mg by mouth       No current facility-administered medications for this visit.         No Known Allergies  Patient Active Problem List    Diagnosis Date Noted    Palpitations 2019    H/O ascending aorta repair 2017    Hashimoto's thyroiditis     Primary hypothyroidism

## 2023-10-20 ENCOUNTER — OFFICE VISIT (OUTPATIENT)
Dept: ENDOCRINOLOGY | Age: 48
End: 2023-10-20
Payer: COMMERCIAL

## 2023-10-20 VITALS
HEART RATE: 55 BPM | OXYGEN SATURATION: 99 % | BODY MASS INDEX: 19 KG/M2 | DIASTOLIC BLOOD PRESSURE: 68 MMHG | WEIGHT: 148 LBS | SYSTOLIC BLOOD PRESSURE: 98 MMHG

## 2023-10-20 DIAGNOSIS — E03.9 PRIMARY HYPOTHYROIDISM: Primary | ICD-10-CM

## 2023-10-20 DIAGNOSIS — E04.1 THYROID NODULE: ICD-10-CM

## 2023-10-20 DIAGNOSIS — E06.3 HASHIMOTO'S THYROIDITIS: ICD-10-CM

## 2023-10-20 LAB — TSH SERPL DL<=0.005 MIU/L-ACNC: 2.24 UIU/ML (ref 0.45–4.5)

## 2023-10-20 PROCEDURE — 99214 OFFICE O/P EST MOD 30 MIN: CPT | Performed by: INTERNAL MEDICINE

## 2023-10-20 RX ORDER — LEVOTHYROXINE SODIUM 50 UG/1
TABLET ORAL
Qty: 125 TABLET | Refills: 3 | Status: SHIPPED | OUTPATIENT
Start: 2023-10-20

## 2023-10-20 ASSESSMENT — ENCOUNTER SYMPTOMS
DIARRHEA: 0
COUGH: 1
CONSTIPATION: 0
ROS SKIN COMMENTS: DENIES HAIR LOSS, DRY SKIN.

## 2023-10-25 ENCOUNTER — OFFICE VISIT (OUTPATIENT)
Age: 48
End: 2023-10-25
Payer: COMMERCIAL

## 2023-10-25 VITALS
DIASTOLIC BLOOD PRESSURE: 70 MMHG | SYSTOLIC BLOOD PRESSURE: 110 MMHG | HEART RATE: 62 BPM | HEIGHT: 72 IN | WEIGHT: 148.6 LBS | BODY MASS INDEX: 20.13 KG/M2

## 2023-10-25 DIAGNOSIS — I77.810 ASCENDING AORTA DILATION (HCC): ICD-10-CM

## 2023-10-25 DIAGNOSIS — R00.2 PALPITATIONS: ICD-10-CM

## 2023-10-25 DIAGNOSIS — I36.1 NONRHEUMATIC TRICUSPID VALVE REGURGITATION: Primary | ICD-10-CM

## 2023-10-25 PROCEDURE — 99213 OFFICE O/P EST LOW 20 MIN: CPT | Performed by: INTERNAL MEDICINE

## 2023-10-25 RX ORDER — ATENOLOL 25 MG/1
25 TABLET ORAL DAILY
Qty: 90 TABLET | Refills: 3 | Status: SHIPPED | OUTPATIENT
Start: 2023-10-25

## 2023-10-25 NOTE — PROGRESS NOTES
[address] [facility], [address]  PHONE: [phone]     10/25/23    NAME:  Gerardo Jang  : 1975  MRN: [id number]       SUBJECTIVE:   Gerardo Jang is a 51 y.o. female seen for a follow up visit regarding the following:     Chief Complaint   Patient presents with    Palpitations       HPI:   Prior Marfan's, surgery with Dr. Piedad Fragoso in [address] [place] [address] in . Valve sparing root surgery in . CTA 3/2023: Stable appearance of ascending tubular thoracic aortic repair. Echo 3/2023: normal EF, no AI, no AS, mild MVP, mild MR     Some URI sx, coughing at times. More family and job stress. NO other angina. NO CP. Patient denies recent history of orthopnea, PND, excessive dizziness and/or syncope. Past Medical History, Past Surgical History, Family history, Social History, and Medications were all reviewed with the patient today and updated as necessary. Current Outpatient Medications   Medication Sig Dispense Refill    atenolol (TENORMIN) 25 MG tablet Take 1 tablet by mouth daily 90 tablet 3    LEVOXYL 50 MCG tablet 1 tablet once a day with an extra tablet on Sundays,  and  125 tablet 3    Multiple Vitamin (MULTIVITAMIN PO) Take by mouth      ZINC PO Take by mouth as needed      ALPRAZolam (XANAX) 0.25 MG tablet Take 1 tablet by mouth as needed. Cetirizine HCl 10 MG CAPS Take 1 tablet by mouth daily      meclizine (ANTIVERT) 25 MG tablet Take 1 tablet by mouth as needed      metaxalone (SKELAXIN) 800 MG tablet Take 0.5 tablets by mouth as needed      QUEtiapine (SEROQUEL) 50 MG tablet Take 0.5-1 tablets by mouth       No current facility-administered medications for this visit.         No Known Allergies  Patient Active Problem List    Diagnosis Date Noted    Palpitations 2019    H/O ascending aorta repair 2017    Hashimoto's thyroiditis     Primary hypothyroidism     Thyroid nodule     Depression     Ascending aorta dilation

## 2024-02-01 ENCOUNTER — OFFICE VISIT (OUTPATIENT)
Dept: OBGYN CLINIC | Age: 49
End: 2024-02-01
Payer: COMMERCIAL

## 2024-02-01 ENCOUNTER — PROCEDURE VISIT (OUTPATIENT)
Dept: OBGYN CLINIC | Age: 49
End: 2024-02-01
Payer: COMMERCIAL

## 2024-02-01 VITALS — BODY MASS INDEX: 19.08 KG/M2 | SYSTOLIC BLOOD PRESSURE: 102 MMHG | DIASTOLIC BLOOD PRESSURE: 64 MMHG | HEIGHT: 72 IN

## 2024-02-01 DIAGNOSIS — R10.2 PELVIC PAIN: ICD-10-CM

## 2024-02-01 DIAGNOSIS — N83.201 RIGHT OVARIAN CYST: Primary | ICD-10-CM

## 2024-02-01 DIAGNOSIS — R10.2 PELVIC PAIN IN FEMALE: Primary | ICD-10-CM

## 2024-02-01 PROCEDURE — 99214 OFFICE O/P EST MOD 30 MIN: CPT | Performed by: OBSTETRICS & GYNECOLOGY

## 2024-02-01 PROCEDURE — 76830 TRANSVAGINAL US NON-OB: CPT | Performed by: OBSTETRICS & GYNECOLOGY

## 2024-02-01 ASSESSMENT — PATIENT HEALTH QUESTIONNAIRE - PHQ9
SUM OF ALL RESPONSES TO PHQ9 QUESTIONS 1 & 2: 0
1. LITTLE INTEREST OR PLEASURE IN DOING THINGS: 0
SUM OF ALL RESPONSES TO PHQ QUESTIONS 1-9: 0
2. FEELING DOWN, DEPRESSED OR HOPELESS: 0

## 2024-02-01 NOTE — PROGRESS NOTES
MYRINGOTOMY Bilateral      myringotomy    OTHER SURGICAL HISTORY  11/22/2016    Left eye surgery/Lensectomy    OTHER SURGICAL HISTORY  2008    Aortic root repair    OVARIAN CYST SURGERY      TONSILLECTOMY AND ADENOIDECTOMY  As a child    TUBAL LIGATION  1999       Her current meds are:    Current Outpatient Medications:     Cyanocobalamin (VITAMIN B-12 PO), Take by mouth, Disp: , Rfl:     atenolol (TENORMIN) 25 MG tablet, Take 1 tablet by mouth daily, Disp: 90 tablet, Rfl: 3    LEVOXYL 50 MCG tablet, 1 tablet once a day with an extra tablet on Sundays, Wednesdays and Fridays, Disp: 125 tablet, Rfl: 3    Multiple Vitamin (MULTIVITAMIN PO), Take by mouth, Disp: , Rfl:     ALPRAZolam (XANAX) 0.25 MG tablet, Take 1 tablet by mouth as needed., Disp: , Rfl:     Cetirizine HCl 10 MG CAPS, Take 1 tablet by mouth daily, Disp: , Rfl:     meclizine (ANTIVERT) 25 MG tablet, Take 1 tablet by mouth as needed, Disp: , Rfl:     metaxalone (SKELAXIN) 800 MG tablet, Take 0.5 tablets by mouth as needed, Disp: , Rfl:     QUEtiapine (SEROQUEL) 50 MG tablet, Take 0.5-1 tablets by mouth, Disp: , Rfl:     ZINC PO, Take by mouth as needed (Patient not taking: Reported on 2/1/2024), Disp: , Rfl:      Review of Systems  Neg except hpi       PHYSICAL EXAM:  /64 (Site: Left Upper Arm, Position: Sitting)   Ht 1.88 m (6' 2\")   BMI 19.08 kg/m²   Physical Exam  General: well nourished well developed female in nad   Heart rrr  Lungs cta b&s  Abdomen soft nontender.  No enlargement of liver. No rebound or guarding.     Extremities: no calf pain  Pelvic exam: deferred.     ASSESSMENT / PLAN:  Winifred was seen today for ultrasound.    Diagnoses and all orders for this visit:    Right ovarian cyst  -     ; Future  -         Pelvic pain    Could have surgery and have ovary removed.  Could try to do D&C at same time but high risk of perforation with hx of failed endo biopsy in office and ablation.  Consider hysterectomy and removing

## 2024-02-02 LAB — CANCER AG125 SERPL-ACNC: 15 U/ML (ref 1.5–35)

## 2024-04-24 ENCOUNTER — OFFICE VISIT (OUTPATIENT)
Dept: OBGYN CLINIC | Age: 49
End: 2024-04-24
Payer: COMMERCIAL

## 2024-04-24 VITALS
SYSTOLIC BLOOD PRESSURE: 102 MMHG | HEIGHT: 72 IN | BODY MASS INDEX: 20.42 KG/M2 | DIASTOLIC BLOOD PRESSURE: 66 MMHG | WEIGHT: 150.8 LBS

## 2024-04-24 DIAGNOSIS — Z76.89 ENCOUNTER TO ESTABLISH CARE: ICD-10-CM

## 2024-04-24 DIAGNOSIS — F41.0 PANIC ATTACK: Primary | ICD-10-CM

## 2024-04-24 DIAGNOSIS — F41.9 ANXIETY: ICD-10-CM

## 2024-04-24 PROCEDURE — 99214 OFFICE O/P EST MOD 30 MIN: CPT | Performed by: OBSTETRICS & GYNECOLOGY

## 2024-04-24 RX ORDER — ALPRAZOLAM 0.25 MG/1
0.25 TABLET ORAL 3 TIMES DAILY PRN
Qty: 20 TABLET | Refills: 0 | Status: SHIPPED | OUTPATIENT
Start: 2024-04-24 | End: 2024-05-31

## 2024-04-24 ASSESSMENT — PATIENT HEALTH QUESTIONNAIRE - PHQ9
9. THOUGHTS THAT YOU WOULD BE BETTER OFF DEAD, OR OF HURTING YOURSELF: NOT AT ALL
5. POOR APPETITE OR OVEREATING: NOT AT ALL
8. MOVING OR SPEAKING SO SLOWLY THAT OTHER PEOPLE COULD HAVE NOTICED. OR THE OPPOSITE, BEING SO FIGETY OR RESTLESS THAT YOU HAVE BEEN MOVING AROUND A LOT MORE THAN USUAL: SEVERAL DAYS
2. FEELING DOWN, DEPRESSED OR HOPELESS: NEARLY EVERY DAY
SUM OF ALL RESPONSES TO PHQ QUESTIONS 1-9: 11
7. TROUBLE CONCENTRATING ON THINGS, SUCH AS READING THE NEWSPAPER OR WATCHING TELEVISION: NOT AT ALL
3. TROUBLE FALLING OR STAYING ASLEEP: SEVERAL DAYS
4. FEELING TIRED OR HAVING LITTLE ENERGY: NEARLY EVERY DAY
SUM OF ALL RESPONSES TO PHQ QUESTIONS 1-9: 11
1. LITTLE INTEREST OR PLEASURE IN DOING THINGS: NEARLY EVERY DAY
SUM OF ALL RESPONSES TO PHQ9 QUESTIONS 1 & 2: 6
10. IF YOU CHECKED OFF ANY PROBLEMS, HOW DIFFICULT HAVE THESE PROBLEMS MADE IT FOR YOU TO DO YOUR WORK, TAKE CARE OF THINGS AT HOME, OR GET ALONG WITH OTHER PEOPLE: SOMEWHAT DIFFICULT
6. FEELING BAD ABOUT YOURSELF - OR THAT YOU ARE A FAILURE OR HAVE LET YOURSELF OR YOUR FAMILY DOWN: NOT AT ALL

## 2024-04-24 NOTE — PROGRESS NOTES
Winifred  is a 49 y.o. female, No obstetric history on file..  No LMP recorded. Patient has had an ablation., who is being seen for symptoms of feeling down, fatigued, moments of dizziness but states that she has had this off and on her whole life, feels like a \"panic dizzy thing\", trouble sleeping. She feels that \"everything is off\". She previously had rx of xanax from PCP, changed PCP and new one does not write these medications. Requesting refill.   Asked pt how often taking.  Had a rx for 2 years and did not take often.    Has a lot of stress -changing jobs to help /be close to grandchildren. Her son went through a bad divorce.  Has aches and pains with her body that she thinks is from her marfans.  She does not have a good pcp - does not really know who to ask.  Encouraged her to establish care with pcp. Offered trial of ssris -does not like weight changes assoc with this.  Denies suicidal thoughts.   HISTORY:    OB History          3    Para        Term                AB   1    Living   1         SAB        IAB        Ectopic        Molar        Multiple        Live Births                  GYN History         Sexual History:   Social History     Substance and Sexual Activity   Sexual Activity Yes    Partners: Male    Birth control/protection: Surgical    Comment: BTL/Ablation          has a past medical history of Allergic rhinitis, Anterior chest wall pain, Anxiety, Aortic aneurysm (HCC), Autoimmune disorder (HCC), Chronic pain, Depression, Ectopia lentis, Endometriosis, Hashimoto's thyroiditis, Herpes simplex virus (HSV) infection, Insomnia, Marfan's syndrome, Primary hypothyroidism, Raynaud's disease, Rheumatic tricuspid regurgitation, Tarlov cysts, and Thyroid nodule. .    Past Surgical History:   Procedure Laterality Date     SECTION   and     ENDOMETRIAL ABLATION      MYRINGOTOMY Bilateral      myringotomy    OTHER SURGICAL HISTORY  2016    Left eye surgery/Lensectomy

## 2024-05-14 ENCOUNTER — PATIENT MESSAGE (OUTPATIENT)
Dept: ENDOCRINOLOGY | Age: 49
End: 2024-05-14

## 2024-05-14 DIAGNOSIS — E03.9 PRIMARY HYPOTHYROIDISM: Primary | ICD-10-CM

## 2024-05-15 NOTE — TELEPHONE ENCOUNTER
From: Winifred Bhat  To: Dr. Duane Perdomo  Sent: 5/14/2024 6:42 PM EDT  Subject: Lab order and possible virtual appointment     Good evening,   Over the past few months, I have struggled with a few health related issues which I have spoken to my OBGYN about. I have had bouts of anxiety, dizziness, not sleeping well and I have gained roughly 6 pounds in the last month or two. She asked me if my thyroid was ok and I said I assumed it was. Could I go ahead and use the order you created for me to check on things? I definitely have not felt like myself and while it may not seem that significant, the weight gain bothers me because I have not changed anything in my diet. I remember feeling “off” like this before and my medication needed to be tweaked. I obviously have no idea if that’s the issue, so I’d like to get it checked and follow up with you if needed. I’m not due to see you again until October, but I was wondering if we could do a virtual appointment to discuss the lab results if you allow me to go ahead and do the blood work. I’d rather get a handle on whatever’s happening now rather than waiting until things are worse. I   understand it could be other hormonal changes since I’m now 49….  Thank you for your time,   Winifred

## 2024-05-22 LAB — TSH SERPL DL<=0.005 MIU/L-ACNC: 1.43 UIU/ML (ref 0.45–4.5)

## 2024-06-14 ENCOUNTER — OFFICE VISIT (OUTPATIENT)
Age: 49
End: 2024-06-14
Payer: COMMERCIAL

## 2024-06-14 VITALS
HEIGHT: 72 IN | DIASTOLIC BLOOD PRESSURE: 68 MMHG | SYSTOLIC BLOOD PRESSURE: 112 MMHG | BODY MASS INDEX: 20.45 KG/M2 | HEART RATE: 52 BPM | WEIGHT: 151 LBS

## 2024-06-14 DIAGNOSIS — Z98.890 H/O ASCENDING AORTA REPAIR: ICD-10-CM

## 2024-06-14 DIAGNOSIS — Q87.40 MARFAN'S DISEASE: ICD-10-CM

## 2024-06-14 DIAGNOSIS — I77.810 ASCENDING AORTA DILATION (HCC): Primary | ICD-10-CM

## 2024-06-14 DIAGNOSIS — R00.2 PALPITATIONS: ICD-10-CM

## 2024-06-14 PROCEDURE — 99214 OFFICE O/P EST MOD 30 MIN: CPT | Performed by: INTERNAL MEDICINE

## 2024-06-14 PROCEDURE — 93000 ELECTROCARDIOGRAM COMPLETE: CPT | Performed by: INTERNAL MEDICINE

## 2024-06-14 RX ORDER — ATENOLOL 25 MG/1
25 TABLET ORAL DAILY
Qty: 90 TABLET | Refills: 3 | Status: SHIPPED | OUTPATIENT
Start: 2024-06-14

## 2024-06-14 RX ORDER — ALPRAZOLAM 0.25 MG/1
0.25 TABLET ORAL NIGHTLY PRN
COMMUNITY

## 2024-06-14 NOTE — PROGRESS NOTES
2 [facility], [place] [address]  PHONE: [phone]     24    NAME:  Winifred Bhat  : 1975  MRN: [id number]       SUBJECTIVE:   Winifred Bhat is a 49 y.o. female seen for a follow up visit regarding the following:     Chief Complaint   Patient presents with    Palpitations     HPI:   Prior Marfan's, surgery with Dr. Abel in [place] in .   Valve sparing root surgery in .     CTA 3/2023 and 2023: Stable appearance of ascending tubular thoracic aortic repair.   Echo 3/2023: normal EF, no AI, no AS, mild MVP, mild MR     Some more stress at times, \"bad past few years\".  Getting new job this fall.  Home stress.  No new palp.   No new PORRAS, SOB, edema.  No pressure.  NO other angina.   NO CP.  Patient denies recent history of orthopnea, PND, excessive dizziness and/or syncope.       Past Medical History, Past Surgical History, Family history, Social History, and Medications were all reviewed with the patient today and updated as necessary.     Current Outpatient Medications   Medication Sig Dispense Refill    ALPRAZolam (XANAX) 0.25 MG tablet Take 1 tablet by mouth nightly as needed for Sleep. Max Daily Amount: 0.25 mg      atenolol (TENORMIN) 25 MG tablet Take 1 tablet by mouth daily 90 tablet 3    Cyanocobalamin (VITAMIN B-12 PO) Take by mouth      LEVOXYL 50 MCG tablet 1 tablet once a day with an extra tablet on Sundays,  and  125 tablet 3    Multiple Vitamin (MULTIVITAMIN PO) Take by mouth      Cetirizine HCl 10 MG CAPS Take 1 tablet by mouth daily      meclizine (ANTIVERT) 25 MG tablet Take 1 tablet by mouth as needed      metaxalone (SKELAXIN) 800 MG tablet Take 0.5 tablets by mouth as needed      QUEtiapine (SEROQUEL) 50 MG tablet Take 0.5-1 tablets by mouth      ZINC PO Take by mouth as needed       No current facility-administered medications for this visit.        No Known Allergies  Patient Active Problem List    Diagnosis Date Noted

## 2024-07-16 ENCOUNTER — OFFICE VISIT (OUTPATIENT)
Dept: OBGYN CLINIC | Age: 49
End: 2024-07-16
Payer: COMMERCIAL

## 2024-07-16 ENCOUNTER — PROCEDURE VISIT (OUTPATIENT)
Dept: OBGYN CLINIC | Age: 49
End: 2024-07-16
Payer: COMMERCIAL

## 2024-07-16 VITALS
WEIGHT: 149.8 LBS | DIASTOLIC BLOOD PRESSURE: 62 MMHG | HEIGHT: 72 IN | SYSTOLIC BLOOD PRESSURE: 106 MMHG | BODY MASS INDEX: 20.29 KG/M2

## 2024-07-16 DIAGNOSIS — N83.209 CYST OF OVARY, UNSPECIFIED LATERALITY: ICD-10-CM

## 2024-07-16 DIAGNOSIS — R63.5 WEIGHT GAIN: Primary | ICD-10-CM

## 2024-07-16 DIAGNOSIS — N83.201 RIGHT OVARIAN CYST: Primary | ICD-10-CM

## 2024-07-16 LAB
ESTRADIOL SERPL-MCNC: 58.5 PG/ML
FSH SERPL-ACNC: 19.5 MIU/ML
LH SERPL-ACNC: 8.6 MIU/ML
PROGEST SERPL-MCNC: 1.95 NG/ML

## 2024-07-16 PROCEDURE — 76830 TRANSVAGINAL US NON-OB: CPT | Performed by: OBSTETRICS & GYNECOLOGY

## 2024-07-16 PROCEDURE — 99214 OFFICE O/P EST MOD 30 MIN: CPT | Performed by: OBSTETRICS & GYNECOLOGY

## 2024-07-16 ASSESSMENT — PATIENT HEALTH QUESTIONNAIRE - PHQ9
SUM OF ALL RESPONSES TO PHQ QUESTIONS 1-9: 2
1. LITTLE INTEREST OR PLEASURE IN DOING THINGS: SEVERAL DAYS
SUM OF ALL RESPONSES TO PHQ9 QUESTIONS 1 & 2: 2
2. FEELING DOWN, DEPRESSED OR HOPELESS: SEVERAL DAYS

## 2024-07-16 NOTE — PROGRESS NOTES
Winifred  is a 49 y.o. female, No obstetric history on file..  No LMP recorded. Patient has had an ablation., who is being seen for ultrasound follow up of ovarian cyst. She was last seen on 24. Pt states that she is not having any pain or bleeding, she has had 1 \"small period\" since last visit. Thinks this was in May 2024.   Maybe also had bleeding 6 months ago. That is what brought her here.  Getting ready to start her new job with .   Her us this time looks better.  Hx of ablation.  Lining with fluid but no masses.  Right ovary with simple cyst that is smaller and collapsing cyst as well.  She has gained some weight.  Has altered nothing. Discussed likely perimenopausal transition.  She denies hot flashes.      US Findings:  GYN US PERFORMED SECONDARY TO FOLLOW UP RT OVARIAN CYSTS, HX OF ABLATION AND LT   OOPHORECTOMY   CX CONTAINS FLUID AND NONVASCULAR SOLID APPEARING AREA- POSSIBLE BLOOD CLOT   UTERUS IS ANTEVERTED, INHOMOGENEOUS, AND ARCUATE IN SHAPE. APPEARANCE C/W POSSIBLE   ADENOMYOSIS.   POSTERIOR FIBROID MEASURING 1.4 X 1.1 X 1.3 CM   ENDO=6.9 MM , NO INTRACAVITARY MASSES VISUALIZED   ROV ENLARGED WITH SIMPLE CYST MEASURING 1.5 X 1.1 X 1.1 CM (PREV- 1.9 X 2.4 X 2.3 CM). CYST IS SMALLER   THAN PREVIOUS AND NOW APPEARS SIMPLE. THERE IS ALSO A COLLAPSING CYST MEASURING 2.1 X 0.6 X 1.2   CM.   LOV SURGICALLY REMOVED   BILATERAL ADN APPEAR WNL     HISTORY:    OB History          3    Para        Term                AB   1    Living   1         SAB        IAB        Ectopic        Molar        Multiple        Live Births                  GYN History         Sexual History:   Social History     Substance and Sexual Activity   Sexual Activity Yes    Partners: Male    Birth control/protection: Surgical    Comment: BTL/Ablation          has a past medical history of Allergic rhinitis, Anterior chest wall pain, Anxiety, Aortic aneurysm (HCC), Autoimmune disorder (HCC), Chronic pain,

## 2024-07-18 LAB — TESTOST FREE SERPL-MCNC: 0.4 PG/ML (ref 0–4.2)

## 2024-10-07 ENCOUNTER — OFFICE VISIT (OUTPATIENT)
Dept: OBGYN CLINIC | Age: 49
End: 2024-10-07
Payer: COMMERCIAL

## 2024-10-07 VITALS
DIASTOLIC BLOOD PRESSURE: 58 MMHG | HEIGHT: 72 IN | SYSTOLIC BLOOD PRESSURE: 100 MMHG | OXYGEN SATURATION: 99 % | WEIGHT: 150.4 LBS | HEART RATE: 55 BPM | BODY MASS INDEX: 20.37 KG/M2

## 2024-10-07 DIAGNOSIS — Z12.31 SCREENING MAMMOGRAM, ENCOUNTER FOR: ICD-10-CM

## 2024-10-07 DIAGNOSIS — Z12.11 ENCOUNTER FOR SCREENING COLONOSCOPY: ICD-10-CM

## 2024-10-07 DIAGNOSIS — Z13.89 SCREENING FOR GENITOURINARY CONDITION: Primary | ICD-10-CM

## 2024-10-07 DIAGNOSIS — Z01.419 WELL WOMAN EXAM: ICD-10-CM

## 2024-10-07 DIAGNOSIS — R45.89 FLAT AFFECT: ICD-10-CM

## 2024-10-07 LAB
BILIRUBIN, URINE, POC: NEGATIVE
BLOOD URINE, POC: NEGATIVE
GLUCOSE URINE, POC: NEGATIVE
KETONES, URINE, POC: NEGATIVE
LEUKOCYTE ESTERASE, URINE, POC: NEGATIVE
NITRITE, URINE, POC: NEGATIVE
PH, URINE, POC: 6 (ref 4.6–8)
PROTEIN,URINE, POC: NEGATIVE
SPECIFIC GRAVITY, URINE, POC: 1.01 (ref 1–1.03)
URINALYSIS CLARITY, POC: CLEAR
URINALYSIS COLOR, POC: YELLOW
UROBILINOGEN, POC: NORMAL MG/DL

## 2024-10-07 PROCEDURE — 99396 PREV VISIT EST AGE 40-64: CPT | Performed by: OBSTETRICS & GYNECOLOGY

## 2024-10-07 PROCEDURE — 81002 URINALYSIS NONAUTO W/O SCOPE: CPT | Performed by: OBSTETRICS & GYNECOLOGY

## 2024-10-07 SDOH — ECONOMIC STABILITY: FOOD INSECURITY: WITHIN THE PAST 12 MONTHS, THE FOOD YOU BOUGHT JUST DIDN'T LAST AND YOU DIDN'T HAVE MONEY TO GET MORE.: NEVER TRUE

## 2024-10-07 SDOH — ECONOMIC STABILITY: INCOME INSECURITY: HOW HARD IS IT FOR YOU TO PAY FOR THE VERY BASICS LIKE FOOD, HOUSING, MEDICAL CARE, AND HEATING?: SOMEWHAT HARD

## 2024-10-07 SDOH — ECONOMIC STABILITY: FOOD INSECURITY: WITHIN THE PAST 12 MONTHS, YOU WORRIED THAT YOUR FOOD WOULD RUN OUT BEFORE YOU GOT MONEY TO BUY MORE.: NEVER TRUE

## 2024-10-07 SDOH — ECONOMIC STABILITY: TRANSPORTATION INSECURITY
IN THE PAST 12 MONTHS, HAS LACK OF TRANSPORTATION KEPT YOU FROM MEETINGS, WORK, OR FROM GETTING THINGS NEEDED FOR DAILY LIVING?: NO

## 2024-10-07 ASSESSMENT — PATIENT HEALTH QUESTIONNAIRE - PHQ9
SUM OF ALL RESPONSES TO PHQ QUESTIONS 1-9: 8
3. TROUBLE FALLING OR STAYING ASLEEP: SEVERAL DAYS
6. FEELING BAD ABOUT YOURSELF - OR THAT YOU ARE A FAILURE OR HAVE LET YOURSELF OR YOUR FAMILY DOWN: NOT AT ALL
SUM OF ALL RESPONSES TO PHQ QUESTIONS 1-9: 8
7. TROUBLE CONCENTRATING ON THINGS, SUCH AS READING THE NEWSPAPER OR WATCHING TELEVISION: SEVERAL DAYS
4. FEELING TIRED OR HAVING LITTLE ENERGY: SEVERAL DAYS
1. LITTLE INTEREST OR PLEASURE IN DOING THINGS: MORE THAN HALF THE DAYS
9. THOUGHTS THAT YOU WOULD BE BETTER OFF DEAD, OR OF HURTING YOURSELF: NOT AT ALL
8. MOVING OR SPEAKING SO SLOWLY THAT OTHER PEOPLE COULD HAVE NOTICED. OR THE OPPOSITE, BEING SO FIGETY OR RESTLESS THAT YOU HAVE BEEN MOVING AROUND A LOT MORE THAN USUAL: NOT AT ALL
SUM OF ALL RESPONSES TO PHQ QUESTIONS 1-9: 8
2. FEELING DOWN, DEPRESSED OR HOPELESS: MORE THAN HALF THE DAYS
SUM OF ALL RESPONSES TO PHQ QUESTIONS 1-9: 8
5. POOR APPETITE OR OVEREATING: SEVERAL DAYS
SUM OF ALL RESPONSES TO PHQ9 QUESTIONS 1 & 2: 4
10. IF YOU CHECKED OFF ANY PROBLEMS, HOW DIFFICULT HAVE THESE PROBLEMS MADE IT FOR YOU TO DO YOUR WORK, TAKE CARE OF THINGS AT HOME, OR GET ALONG WITH OTHER PEOPLE: SOMEWHAT DIFFICULT

## 2024-10-07 NOTE — PROGRESS NOTES
noHPI:  Ms. Bhat is a 49 y.o. female No obstetric history on file. who is here today for a well woman exam. She complains of nothing.  New job as .  Did have an episode first of sept with right lower quadrant pain and bleeding.  Hx of ablation long time ago.  Sporadic bleeding.  Her fsh was 19 and lh 8 on labs.  Her free t was low.  Pt reports flat affect.  She tells me that she takes seroquel for sleep. Discussed that this may be contributing to flat affect.  Will refer to psych.         Date Performed Result   PAP 21 Wnl hpv neg   Mammogram     Colonoscopy Never     Dexa Never?          OB History          3    Para        Term                AB   1    Living   1         SAB        IAB        Ectopic        Molar        Multiple        Live Births                  GYN History     No LMP recorded. Patient has had an ablation.   Hx of right ovarian pain 1 month ago.  Some sporadic bleeding.        Past Medical History:   Diagnosis Date    Allergic rhinitis     Anterior chest wall pain     Anxiety     Aortic aneurysm (HCC)     dilation of the aorta/stable post repair-- Marco Antonio Krysten     Autoimmune disorder (HCC)     Chronic pain     back pain/ connective tissue / Marfan syndrome    Depression     Ectopia lentis     Endometriosis     Hashimoto's thyroiditis     Herpes simplex virus (HSV) infection 30 years ago    Very rare occurrences    Insomnia     Marfan's syndrome     Primary hypothyroidism     Raynaud's disease     Rheumatic tricuspid regurgitation     insuff    Tarlov cysts     Dural ectasia    Thyroid nodule      Past Surgical History:   Procedure Laterality Date     SECTION   and     ENDOMETRIAL ABLATION      MYRINGOTOMY Bilateral      myringotomy    OTHER SURGICAL HISTORY  2016    Left eye surgery/Lensectomy    OTHER SURGICAL HISTORY      Aortic root repair    OVARIAN CYST SURGERY      TONSILLECTOMY AND ADENOIDECTOMY  As a child    TUBAL

## 2024-10-13 LAB
COLLECTION METHOD: NORMAL
CYTOLOGIST CVX/VAG CYTO: NORMAL
CYTOLOGY CVX/VAG DOC THIN PREP: NORMAL
HPV APTIMA: NEGATIVE
Lab: NORMAL
PAP SOURCE: NORMAL
PATH REPORT.FINAL DX SPEC: NORMAL
STAT OF ADQ CVX/VAG CYTO-IMP: NORMAL

## 2024-10-14 DIAGNOSIS — E03.9 PRIMARY HYPOTHYROIDISM: ICD-10-CM

## 2024-10-14 RX ORDER — LEVOTHYROXINE SODIUM 50 UG/1
TABLET ORAL
Qty: 125 TABLET | Refills: 0 | Status: SHIPPED | OUTPATIENT
Start: 2024-10-14

## 2024-10-14 NOTE — TELEPHONE ENCOUNTER
Pt does not have enough medication to get thru to her one year follow up on 11.19.24.   Sent reminder to do labs before visit.  Pended refill below

## 2024-10-17 DIAGNOSIS — E03.9 PRIMARY HYPOTHYROIDISM: ICD-10-CM

## 2024-10-17 RX ORDER — LEVOTHYROXINE SODIUM 50 UG/1
TABLET ORAL
Qty: 125 TABLET | Refills: 3 | OUTPATIENT
Start: 2024-10-17

## 2024-11-19 ENCOUNTER — OFFICE VISIT (OUTPATIENT)
Dept: ENDOCRINOLOGY | Age: 49
End: 2024-11-19
Payer: COMMERCIAL

## 2024-11-19 VITALS
RESPIRATION RATE: 16 BRPM | HEIGHT: 72 IN | DIASTOLIC BLOOD PRESSURE: 64 MMHG | WEIGHT: 148 LBS | SYSTOLIC BLOOD PRESSURE: 108 MMHG | BODY MASS INDEX: 20.05 KG/M2 | HEART RATE: 56 BPM | OXYGEN SATURATION: 99 %

## 2024-11-19 DIAGNOSIS — E04.1 THYROID NODULE: ICD-10-CM

## 2024-11-19 DIAGNOSIS — E03.9 PRIMARY HYPOTHYROIDISM: Primary | ICD-10-CM

## 2024-11-19 DIAGNOSIS — E06.3 HASHIMOTO'S THYROIDITIS: ICD-10-CM

## 2024-11-19 PROCEDURE — 99214 OFFICE O/P EST MOD 30 MIN: CPT | Performed by: INTERNAL MEDICINE

## 2024-11-19 RX ORDER — LEVOTHYROXINE SODIUM 50 UG/1
TABLET ORAL
Qty: 125 TABLET | Refills: 3 | Status: SHIPPED | OUTPATIENT
Start: 2024-11-19

## 2024-11-19 ASSESSMENT — ENCOUNTER SYMPTOMS
ROS SKIN COMMENTS: DENIES ABNORMAL HAIR LOSS, DRY SKIN.
CONSTIPATION: 0
DIARRHEA: 0

## 2024-11-19 NOTE — PROGRESS NOTES
Duane Perdomo MD, FACE  [facility]  [address] [place] [address], [place] [address]  Phone [phone]  Facsimile [phone]          Winifred Bhat is a 49 y.o. female seen 11/19/2024 for follow up of hypothyroidism        ASSESSMENT AND PLAN:    1. Primary hypothyroidism  I am awaiting her most recent thyroid function tests which were drawn yesterday.  Assuming she is euthyroid, she will follow-up in 1 year.    - LEVOXYL 50 MCG tablet; 1 tablet once a day with an extra tablet on Sundays, Wednesdays and Fridays  Dispense: 125 tablet; Refill: 3    2. Hashimoto's thyroiditis  Based on her thyroid ultrasound appearance and positive thyroid peroxidase antibodies, she has Hashimoto's thyroiditis.    3. Thyroid nodule  Thyroid ultrasound performed 11/2021 revealed that the hypoechoic nodule versus pseudo-nodule in the mid right lobe was stable in size compared to the prior ultrasound.  I will not pursue further imaging unless there is a change in her physical examination and/or symptoms.      Follow-up and Dispositions    Return in about 1 year (around 11/19/2025).         HISTORY OF PRESENT ILLNESS:    THYROID DISEASE    Presentation: Hypothyroidism secondary to Hashimoto's thyroiditis.    Symptoms: See review of systems.     Treatment: Takes name brand in AM correctly.    Labs:   6/1/2014: TSH 4.470.  8/5/2014: TSH 3.226, thyroid peroxidase antibodies >900.   1/2/2015: TSH 3.670, thyroid peroxidase antibodies >900.   2/26/2016: TSH 5.610, T4 7.0, T3 uptake 27, free thyroxine index 1.9.   3/9/2016: TSH 2.710, thyroid peroxidase antibodies 1173.  6/8/2016: TSH 1.510.  12/20/2016: TSH 2.542.    3/17/2017: TSH 0.966.    10/31/2017: TSH 2.010.  10/1/2018: TSH 1.350.  10/11/2019: TSH 1.310.  3/10/2020: TSH 2.890, total T4 7.4, T3 of 3.5, free thyroxine index 1.9.  10/16/2020: TSH 2.310.  10/27/2021: TSH 2.760.  10/31/2022: TSH 1.260.  10/18/2023: TSH

## 2024-11-20 LAB
T4 FREE SERPL-MCNC: 1.36 NG/DL (ref 0.82–1.77)
TSH SERPL DL<=0.005 MIU/L-ACNC: 2.38 UIU/ML (ref 0.45–4.5)

## 2025-01-30 ENCOUNTER — OFFICE VISIT (OUTPATIENT)
Age: 50
End: 2025-01-30
Payer: COMMERCIAL

## 2025-01-30 VITALS
BODY MASS INDEX: 20.05 KG/M2 | SYSTOLIC BLOOD PRESSURE: 118 MMHG | HEIGHT: 72 IN | DIASTOLIC BLOOD PRESSURE: 74 MMHG | WEIGHT: 148 LBS | HEART RATE: 54 BPM

## 2025-01-30 DIAGNOSIS — R00.2 PALPITATIONS: Primary | ICD-10-CM

## 2025-01-30 DIAGNOSIS — I77.810 ASCENDING AORTA DILATION (HCC): ICD-10-CM

## 2025-01-30 DIAGNOSIS — Z98.890 H/O ASCENDING AORTA REPAIR: ICD-10-CM

## 2025-01-30 DIAGNOSIS — Q87.40 MARFAN'S DISEASE: ICD-10-CM

## 2025-01-30 DIAGNOSIS — R07.89 CHEST DISCOMFORT: ICD-10-CM

## 2025-01-30 PROCEDURE — 93000 ELECTROCARDIOGRAM COMPLETE: CPT | Performed by: INTERNAL MEDICINE

## 2025-01-30 PROCEDURE — 99214 OFFICE O/P EST MOD 30 MIN: CPT | Performed by: INTERNAL MEDICINE

## 2025-01-30 NOTE — PROGRESS NOTES
2 [facility], [place] [address]  PHONE: [phone]     25    NAME:  Winifred Bhat  : 1975  MRN: [id number]       SUBJECTIVE:   Winifred Bhat is a 49 y.o. female seen for a follow up visit regarding the following:     Chief Complaint   Patient presents with    Palpitations    Follow-up       HPI:   Prior Marfan's, surgery with Dr. Abel in [place] in .   Valve sparing root surgery in .     CTA 3/2023 and 2023: Stable appearance of ascending tubular thoracic aortic repair.   Echo 3/2023: normal EF, no AI, no AS, mild MVP, mild MR     Worsening stress, conflict she says today.  BP higher with stress.    Some CP, she feels is stress related.    No new PORRAS, SOB, edema.  More panic attacks she feels as well.    Patient denies recent history of orthopnea, PND, excessive dizziness and/or syncope.         Past Medical History, Past Surgical History, Family history, Social History, and Medications were all reviewed with the patient today and updated as necessary.     Current Outpatient Medications   Medication Sig Dispense Refill    LEVOXYL 50 MCG tablet 1 tablet once a day with an extra tablet on Sundays,  and  125 tablet 3    UNABLE TO FIND Testosterone Cream 1mg/ml topical  Apply 1mg daily  #30ml with 5 refills  Called to [facility] [phone], spoke with Isidoro 30 mL 5    ALPRAZolam (XANAX) 0.25 MG tablet Take 1 tablet by mouth nightly as needed for Sleep.      atenolol (TENORMIN) 25 MG tablet Take 1 tablet by mouth daily 90 tablet 3    Cyanocobalamin (VITAMIN B-12 PO) Take by mouth      QUEtiapine (SEROQUEL) 50 MG tablet Take 0.5-1 tablets by mouth       No current facility-administered medications for this visit.        No Known Allergies  Patient Active Problem List    Diagnosis Date Noted    Palpitations 2019    H/O ascending aorta repair 2017    Hashimoto's thyroiditis     Primary hypothyroidism     Thyroid nodule

## 2025-02-12 ENCOUNTER — TELEPHONE (OUTPATIENT)
Dept: OBGYN CLINIC | Age: 50
End: 2025-02-12

## 2025-02-12 NOTE — TELEPHONE ENCOUNTER
Pt calls states that Dr Higuera referred her to counseling. She has lost their number and needs to call them.     Routed to referral coord.

## 2025-06-30 RX ORDER — ATENOLOL 25 MG/1
25 TABLET ORAL DAILY
Qty: 90 TABLET | Refills: 3 | Status: SHIPPED | OUTPATIENT
Start: 2025-06-30

## 2025-07-15 ENCOUNTER — OFFICE VISIT (OUTPATIENT)
Age: 50
End: 2025-07-15
Payer: COMMERCIAL

## 2025-07-15 VITALS
SYSTOLIC BLOOD PRESSURE: 104 MMHG | DIASTOLIC BLOOD PRESSURE: 60 MMHG | WEIGHT: 147 LBS | HEART RATE: 57 BPM | HEIGHT: 72 IN | BODY MASS INDEX: 19.91 KG/M2

## 2025-07-15 DIAGNOSIS — Z98.890 H/O ASCENDING AORTA REPAIR: ICD-10-CM

## 2025-07-15 DIAGNOSIS — R00.2 PALPITATIONS: Primary | ICD-10-CM

## 2025-07-15 DIAGNOSIS — I77.810 ASCENDING AORTA DILATION: ICD-10-CM

## 2025-07-15 PROCEDURE — 99214 OFFICE O/P EST MOD 30 MIN: CPT | Performed by: INTERNAL MEDICINE

## 2025-07-15 RX ORDER — ATENOLOL 25 MG/1
25 TABLET ORAL DAILY
Qty: 90 TABLET | Refills: 3 | Status: SHIPPED | OUTPATIENT
Start: 2025-07-15

## 2025-07-15 RX ORDER — LORAZEPAM 0.5 MG/1
0.5 TABLET ORAL EVERY 6 HOURS PRN
COMMUNITY

## 2025-07-15 NOTE — PROGRESS NOTES
2 [facility], [place] [address]  PHONE: [phone]     07/15/25    NAME:  Winifred Bhat  : 1975  MRN: [id number]       SUBJECTIVE:   Winifred Bhat is a 50 y.o. female seen for a follow up visit regarding the following:     Chief Complaint   Patient presents with    Valvular Heart Disease    Palpitations       HPI:   Prior Marfan's, surgery with Dr. Abel in [place] in .   Valve sparing root surgery in .     CTA 3/2023 and 2023: Stable appearance of ascending tubular thoracic aortic repair.   Echo 3/2023: normal EF, no AI, no AS, mild MVP, mild MR     Stress manageable.    Some CP, she feels is stress related.    No new PORRAS, SOB, edema.  Patient denies recent history of orthopnea, PND, excessive dizziness and/or syncope.  HR runs in the 50s, asx now.           Past Medical History, Past Surgical History, Family history, Social History, and Medications were all reviewed with the patient today and updated as necessary.     Current Outpatient Medications   Medication Sig Dispense Refill    LORazepam (ATIVAN) 0.5 MG tablet Take 1 tablet by mouth every 6 hours as needed for Anxiety. Max Daily Amount: 2 mg      atenolol (TENORMIN) 25 MG tablet Take 1 tablet by mouth daily 90 tablet 3    UNABLE TO FIND Testosterone Cream 1mg/ml topical  Apply 1mg daily  #30ml with 5 refills  Called to [facility] [phone], spoke with Isidoro 25 30 mL 5    LEVOXYL 50 MCG tablet 1 tablet once a day with an extra tablet on Sundays,  and  125 tablet 3    Cyanocobalamin (VITAMIN B-12 PO) Take by mouth      ALPRAZolam (XANAX) 0.25 MG tablet Take 1 tablet by mouth nightly as needed for Sleep. (Patient not taking: Reported on 7/15/2025)      QUEtiapine (SEROQUEL) 50 MG tablet Take 0.5-1 tablets by mouth (Patient not taking: Reported on 7/15/2025)       No current facility-administered medications for this visit.        Allergies   Allergen Reactions    Lexapro

## 2025-07-16 ENCOUNTER — TELEPHONE (OUTPATIENT)
Age: 50
End: 2025-07-16

## 2025-07-16 DIAGNOSIS — Z76.89 ENCOUNTER TO ESTABLISH CARE WITH NEW DOCTOR: ICD-10-CM

## 2025-07-16 DIAGNOSIS — E03.9 PRIMARY HYPOTHYROIDISM: Primary | ICD-10-CM

## 2025-07-16 NOTE — TELEPHONE ENCOUNTER
----- Message from Dr. Kee Messina, DO sent at 7/15/2025  2:37 PM EDT -----  Needs new [facility] doc, please refer. Thanks

## 2025-09-02 ENCOUNTER — TELEPHONE (OUTPATIENT)
Age: 50
End: 2025-09-02

## 2025-09-02 DIAGNOSIS — M79.606 LEG PAIN: Primary | ICD-10-CM
